# Patient Record
Sex: MALE | Race: WHITE | NOT HISPANIC OR LATINO | Employment: FULL TIME | URBAN - METROPOLITAN AREA
[De-identification: names, ages, dates, MRNs, and addresses within clinical notes are randomized per-mention and may not be internally consistent; named-entity substitution may affect disease eponyms.]

---

## 2023-07-28 LAB
HEPATITIS B VIRUS SURFACE AG PRESENCE IN SERUM: NONREACTIVE
HEPATITIS C VIRUS AB PRESENCE IN SERUM: NONREACTIVE
HIV 1/ 2 AG/AB SCREEN: NONREACTIVE
SYPHILIS TOTAL AB: NONREACTIVE

## 2023-07-29 LAB
CHLAMYDIA TRACH., AMPLIFIED: NEGATIVE
N. GONORRHEA, AMPLIFIED: NEGATIVE

## 2023-10-22 DIAGNOSIS — Z31.41 FERTILITY TESTING: ICD-10-CM

## 2023-10-23 ENCOUNTER — ANCILLARY PROCEDURE (OUTPATIENT)
Dept: ENDOCRINOLOGY | Facility: CLINIC | Age: 33
End: 2023-10-23
Payer: COMMERCIAL

## 2023-10-23 ENCOUNTER — APPOINTMENT (OUTPATIENT)
Dept: ENDOCRINOLOGY | Facility: CLINIC | Age: 33
End: 2023-10-23
Payer: COMMERCIAL

## 2023-10-23 DIAGNOSIS — Z31.41 FERTILITY TESTING: ICD-10-CM

## 2023-10-23 LAB
ABSTINENCE (DAYS): 3 DAYS (ref 2–7)
AGGLUTINATION (SEMEN): NO
ANALYZED TIME:: ABNORMAL
ANDROLOGY LAB ID#: ABNORMAL
CLUMPS (SEMEN): NO
COLLECTED COMPLETELY: YES
COLLECTION LOCATION:: ABNORMAL
COLLECTION METHOD:: ABNORMAL
CONCENTRATION CN (POST-WASH): 14.54 MILL/ML
CONCENTRATION(SEMEN): 20.73 MILL/ML (ref 15–?)
DEBRIS (SEMEN): YES
LEUKOCYTE (SEMEN): NEGATIVE
NON PROG. MOTILITY (SEMEN): 6 %
NON PROG. MOTILITY CN (POST-WASH): 9 %
PROG. MOTILITY (SEMEN): 42 % (ref 32–?)
PROG. MOTILITY CN (POST-WASH): 53 %
RECEIVED TIME:: ABNORMAL
SEMEN APPEARANCE: NORMAL
SEMEN LIQUEFACTION: NORMAL
SEMEN VISCOSITY: NORMAL
SPERM PROCESS METHOD: ABNORMAL
TOTAL MOTILITY (SEMEN): 48 % (ref 40–?)
TOTAL MOTILITY CN (POST-WASH): 62 %
TOTAL NO OF MOTILE (SEMEN): 13.57 MILL
TOTAL NO OF MOTILE CN (POST-WASH): 1.81 MILL
TOTAL NO OF RND CELLS (SEMEN): 1.1 MILL (ref ?–5)
TOTAL NO OF SPERM (SEMEN): 27.98 MILL (ref 39–?)
TOTAL NO OF SPERM CN (POST-WASH): 2.91 MILL
VOLUME (SEMEN): 1.35 ML (ref 1.5–?)
VOLUME CN (POST-WASH): 0.2 ML

## 2023-11-07 DIAGNOSIS — L02.511 ABSCESS OF FINGER OF RIGHT HAND: Primary | ICD-10-CM

## 2023-11-07 RX ORDER — AMOXICILLIN AND CLAVULANATE POTASSIUM 875; 125 MG/1; MG/1
1 TABLET, FILM COATED ORAL 2 TIMES DAILY
Qty: 20 TABLET | Refills: 0 | Status: SHIPPED | OUTPATIENT
Start: 2023-11-07 | End: 2023-11-17

## 2024-01-02 NOTE — PROGRESS NOTES
No chief complaint on file.      History Of Present Illness  Jesus Castañeda is a 33 y.o. male presenting with anal pain.     Subjective   Jesus Castañeda was referred by PCP  for evaluation of anal pain Symptoms began 5 years ago. He experiences intermittent periods of very sharp pain with bowel movements. His bowel movements are usually formed, occasionally hard    Pain: Yes - with BM  Protruding Tissue: No  Bleeding: Yes - intermittent small volume       Bowel habits:  - Moves bowels daily  - Stool is soft  - he  has to strain No and spends 5 minutes on the toilet to have a BM.   - Issues with leakage or incontinence of stool: No   - Full episodes of incontinence: No   - Leakage of liquid stool or mucous: No   - Wears a pad daily: No   - Effects QoL: Mild  - Pain with BM's: Yes - sharp, stinging  - Protruding Tissue: No.   - Bleeding with BM's: Yes . Drips into toilet bowNo, On stoolYes, In stoolYes, On Toilet Paper when you wipeYes - occasional     Dietary history:   Eats a relatively low fiber diet, limited access to drinking water during day due to work, does not take a fiber supplement    Colonoscopy: no    Past Medical History  He has a past medical history of Other specified health status.    Surgical History  He has a past surgical history that includes Tonsillectomy (09/19/2017); Foot surgery (09/19/2017); and Knee surgery (09/19/2017).     Social History  He reports that he has never smoked. He has never used smokeless tobacco. He reports current alcohol use of about 1.0 standard drink of alcohol per week. He reports that he does not use drugs.    Family History  No family history on file.     Allergies  Patient has no known allergies.    Home Medications  Prior to Admission medications    Not on File       Review of Systems   Gastrointestinal:  Positive for anal bleeding and rectal pain. Negative for abdominal distention, abdominal pain, blood in stool, constipation, diarrhea, nausea and vomiting.   All  "other systems reviewed and are negative.       Physical Exam  Vitals reviewed.   HENT:      Head: Normocephalic.      Mouth/Throat:      Mouth: Mucous membranes are moist.   Eyes:      Extraocular Movements: Extraocular movements intact.   Cardiovascular:      Rate and Rhythm: Normal rate.   Pulmonary:      Effort: Pulmonary effort is normal.   Abdominal:      General: Abdomen is flat.      Palpations: Abdomen is soft.   Genitourinary:     Rectum: Normal.      Comments: High sphincter tone, on eversion evidence of a healing fissure  Musculoskeletal:         General: Normal range of motion.      Cervical back: Normal range of motion.   Neurological:      General: No focal deficit present.      Mental Status: He is alert.   Psychiatric:         Mood and Affect: Mood normal.         Last Recorded Vitals  Blood pressure 145/88, pulse 82, height 1.956 m (6' 5\"), weight (!) 152 kg (335 lb), SpO2 98 %.    Relevant Results        Assessment/Plan     33M with anal fissure    The anatomy, pathophysiology, treatment and options, and long term outcomes of fissures were discussed in detail.The patient will start a bowel management program. The bowel management program I recommended includes a high fiber diet (30-40 gm qd) that includes both soluble and insoluble fiber, a fiber supplement such as Metamucil, Konsyl, Benefiber 1-2 qd, and at least 8-10 glasses of fluid daily. We also discussed limited toilet sitting time.   Warm tub baths 2-4 times per day and with episodes of pain are helpful to allow sphincter relaxation and to improve perianal hygiene.   A prescription was given for topical nifedipine ointment to be used 2 times per day.   RTC acutely prn or in 6-8 weeks if symptoms persist. Discussed in detail including conservative vs surgical management (Botox injection vs. lateral internal sphincterotomy - LIS) and risk of incontinence. Patient education materials were provided    Jesus Castañeda will follow up with our " office in 6 weeks by phone wo report progress or sooner if needed.     I spent a total of 45 min spent on this patient visit.      Dr. Eliane Elam  1/9/2024

## 2024-01-03 ENCOUNTER — OFFICE VISIT (OUTPATIENT)
Dept: SURGERY | Facility: CLINIC | Age: 34
End: 2024-01-03
Payer: COMMERCIAL

## 2024-01-03 VITALS
SYSTOLIC BLOOD PRESSURE: 145 MMHG | HEART RATE: 82 BPM | HEIGHT: 77 IN | OXYGEN SATURATION: 98 % | DIASTOLIC BLOOD PRESSURE: 88 MMHG | BODY MASS INDEX: 37.19 KG/M2 | WEIGHT: 315 LBS

## 2024-01-03 DIAGNOSIS — K60.2 ANAL FISSURE: Primary | ICD-10-CM

## 2024-01-03 PROCEDURE — 99204 OFFICE O/P NEW MOD 45 MIN: CPT | Performed by: SURGERY

## 2024-01-03 PROCEDURE — 1036F TOBACCO NON-USER: CPT | Performed by: SURGERY

## 2024-01-03 ASSESSMENT — PAIN SCALES - GENERAL: PAINLEVEL: 0-NO PAIN

## 2024-01-09 ASSESSMENT — ENCOUNTER SYMPTOMS
ABDOMINAL DISTENTION: 0
ABDOMINAL PAIN: 0
NAUSEA: 0
RECTAL PAIN: 1
BLOOD IN STOOL: 0
DIARRHEA: 0
ANAL BLEEDING: 1
VOMITING: 0
CONSTIPATION: 0

## 2024-03-28 ENCOUNTER — DOCUMENTATION (OUTPATIENT)
Dept: CARE COORDINATION | Facility: CLINIC | Age: 34
End: 2024-03-28
Payer: COMMERCIAL

## 2024-04-01 ENCOUNTER — PATIENT OUTREACH (OUTPATIENT)
Dept: CARE COORDINATION | Facility: CLINIC | Age: 34
End: 2024-04-01
Payer: COMMERCIAL

## 2024-04-01 NOTE — PROGRESS NOTES
Referral received for care management services.  Outreach call to introduce self, available services, and assess needs.  Voicemail message left with my contact information.   ADY Alan  O    Contact: [phone]

## 2024-04-08 DIAGNOSIS — F41.9 ANXIETY: Primary | ICD-10-CM

## 2024-04-09 RX ORDER — BUSPIRONE HYDROCHLORIDE 5 MG/1
5 TABLET ORAL 2 TIMES DAILY
Qty: 20 TABLET | Refills: 0 | Status: SHIPPED | OUTPATIENT
Start: 2024-04-09 | End: 2024-04-16 | Stop reason: SDUPTHER

## 2024-04-09 RX ORDER — ESCITALOPRAM OXALATE 10 MG/1
10 TABLET ORAL DAILY
Qty: 10 TABLET | Refills: 0 | Status: SHIPPED | OUTPATIENT
Start: 2024-04-09 | End: 2024-04-16 | Stop reason: SDUPTHER

## 2024-04-15 ENCOUNTER — PATIENT OUTREACH (OUTPATIENT)
Dept: CARE COORDINATION | Facility: CLINIC | Age: 34
End: 2024-04-15
Payer: COMMERCIAL

## 2024-04-15 NOTE — PROGRESS NOTES
Referral received for care management services.  Outreach call to introduce self, available services, and assess needs.  Final outreach call. Voicemail message left with my contact information.   ADY Alan  [facility]    Contact: [phone]

## 2024-04-16 ENCOUNTER — OFFICE VISIT (OUTPATIENT)
Dept: BEHAVIORAL HEALTH | Facility: CLINIC | Age: 34
End: 2024-04-16
Payer: COMMERCIAL

## 2024-04-16 VITALS
HEART RATE: 80 BPM | SYSTOLIC BLOOD PRESSURE: 141 MMHG | DIASTOLIC BLOOD PRESSURE: 79 MMHG | WEIGHT: 315 LBS | HEIGHT: 77 IN | BODY MASS INDEX: 37.19 KG/M2

## 2024-04-16 DIAGNOSIS — F63.2 KLEPTOMANIA IN ADULT: ICD-10-CM

## 2024-04-16 DIAGNOSIS — F31.81 BIPOLAR 2 DISORDER (MULTI): ICD-10-CM

## 2024-04-16 DIAGNOSIS — F41.9 ANXIETY: ICD-10-CM

## 2024-04-16 DIAGNOSIS — F33.9 EPISODE OF RECURRENT MAJOR DEPRESSIVE DISORDER, UNSPECIFIED DEPRESSION EPISODE SEVERITY (CMS-HCC): Primary | ICD-10-CM

## 2024-04-16 PROCEDURE — 99205 OFFICE O/P NEW HI 60 MIN: CPT

## 2024-04-16 PROCEDURE — 1036F TOBACCO NON-USER: CPT

## 2024-04-16 RX ORDER — ESCITALOPRAM OXALATE 10 MG/1
10 TABLET ORAL DAILY
Qty: 30 TABLET | Refills: 2 | Status: SHIPPED | OUTPATIENT
Start: 2024-04-16 | End: 2024-04-19

## 2024-04-16 RX ORDER — ARIPIPRAZOLE 2 MG/1
2 TABLET ORAL DAILY
Qty: 30 TABLET | Refills: 0 | Status: SHIPPED | OUTPATIENT
Start: 2024-04-16 | End: 2024-05-06 | Stop reason: SDUPTHER

## 2024-04-16 RX ORDER — BUSPIRONE HYDROCHLORIDE 5 MG/1
5 TABLET ORAL 2 TIMES DAILY
Qty: 60 TABLET | Refills: 2 | Status: SHIPPED | OUTPATIENT
Start: 2024-04-16 | End: 2024-06-15
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
## 2024-04-19 RX ORDER — ESCITALOPRAM OXALATE 10 MG/1
10 TABLET ORAL DAILY
Qty: 30 TABLET | Refills: 2 | Status: SHIPPED | OUTPATIENT
Start: 2024-04-19 | End: 2025-04-19

## 2024-04-25 ENCOUNTER — TELEMEDICINE (OUTPATIENT)
Dept: BEHAVIORAL HEALTH | Facility: CLINIC | Age: 34
End: 2024-04-25
Payer: COMMERCIAL

## 2024-04-25 DIAGNOSIS — F33.1 MODERATE EPISODE OF RECURRENT MAJOR DEPRESSIVE DISORDER (MULTI): ICD-10-CM

## 2024-04-25 DIAGNOSIS — F41.9 ANXIETY: ICD-10-CM

## 2024-04-25 PROCEDURE — 90791 PSYCH DIAGNOSTIC EVALUATION: CPT | Mod: U2,95
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
## 2024-04-30 ENCOUNTER — TELEMEDICINE (OUTPATIENT)
Dept: BEHAVIORAL HEALTH | Facility: CLINIC | Age: 34
End: 2024-04-30
Payer: COMMERCIAL

## 2024-04-30 DIAGNOSIS — F31.81 BIPOLAR 2 DISORDER (MULTI): ICD-10-CM

## 2024-04-30 DIAGNOSIS — F33.9 EPISODE OF RECURRENT MAJOR DEPRESSIVE DISORDER, UNSPECIFIED DEPRESSION EPISODE SEVERITY (CMS-HCC): ICD-10-CM

## 2024-04-30 DIAGNOSIS — F63.2 KLEPTOMANIA IN ADULT: ICD-10-CM

## 2024-04-30 PROCEDURE — 99215 OFFICE O/P EST HI 40 MIN: CPT | Performed by: CLINICAL NURSE SPECIALIST

## 2024-04-30 NOTE — PROGRESS NOTES
Outpatient Psychiatry      Subjective   Jesus Castañeda, is a 34 y.o. male,  seen as a new patient to me today for IOP intake assessment.    I have reviewed the initial assessment completed by SRI Germain on 4/25/2024, and certify its validity.    Assessment/Plan   Diagnoses:   Encounter Diagnoses   Name Primary?    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      Treatment Plan/Recommendations:   Admit to IOP for up to 4 days/week or 12 hours/week.  Estimated start date 5/6/2024.  Continue current medications for now.   Follow-up for medication management weekly.        Reason for Visit:  IOP intake.    HPI:  Jesus Castañeda reports     Very difficult time communicating about issues and problems he's going through, and just letting things build up.   He and wife are going through IVF- didn't want to burden her with his problems.   Financial stressors.  He and his wife not seeing eye to eye on things. Seems like since he got out of the hospital they've been having a hard time.   Wife's therapist expressed concern for codependency, and they have started to work on clarifying that  Felt like things were building up for about 2 years     Started Lexapro, Buspar  Abilify ~ 1.5 weeks ago    Having some difficulty getting to sleep (~past week). Once he gets to sleep  Goes to bed around 9:30-10, falls asleep around midnight and gets up around 5 AM.  Appetite has been a little bit lower the past month. Has lost 15 lbs in ~1 month.     Remote history of self-harm by cutting- most recent was ~1 month ago, prior to hospitalization.  Has had suicidal thoughts in the past, but never with specific plan, and never made attempt until prior to hospitalization last month.      (+) impulsivity, lack of  "self-control  \"I know I shouldn't be doing something, but it doesn't cross my mind at that time.\"   Believes he's had an addictive and impulsive personality throughout life, but has gotten worse with stressors building up in life.     Has functioned with normal energy on minimal sleep in the past (2 hrs of sleep over a 48 hr period). Never excessive energy or more extended periods of time (never a week or more).  No history of psychosis.       Previous psychiatric medication trials include:  Zoloft- sexual side effects  Lexapro- beneficial  Alprazolam PRN- beneficial    Past Psychiatric History  Reviewed per medical record.       Pertinent medical diagnoses:  No hx of head injury, concussion, seizure.     Current Medications:  Lexapro 10 mg PO daily   Buspirone 10 mg PO BID  Abilify 2 mg PO daily- started ~1.5 weeks ago  Trazodone 50 mg PO at bedtime PRN for sleep    Medical History:  Past Medical History:   Diagnosis Date    Other specified health status     No known health problems       Surgical History:  Past Surgical History:   Procedure Laterality Date    FOOT SURGERY  09/19/2017    Foot Repair    KNEE SURGERY  09/19/2017    Knee Surgery    TONSILLECTOMY  09/19/2017    Tonsillectomy       Family History:  Reviewed per medical record.    Social History:  Reviewed per medical record.     Substance Use:  Nicotine: vapes nicotine daily x past few months, former smoker (~5-10 cigarettes/day x 10 years). Has attempted to quit   Alcohol: \"socially,\" 2-3 drinks/week. No hx of alcohol abuse.   Cocaine: used around 19-18 years old on weekends.   Caffeine: 1 energy drink daily (~200 mg caffeine)      Record Review: moderate     Medical Review Of Systems:  Pertinent items are noted in HPI.    Psychiatric Review Of Systems:  As noted in HPI.        Objective   Mental Status Exam  General: well groomed, appropriate eye contact  Psychomotor: no psychomotor agitation or retardation, no tremor or other abnormal " "movements  Speech: Clear and Non-pressured  Mood: \" \"  Affect:dysphoric, constricted, anxious, and congruent with mood and topic of conversation  Thought Process: linear, goal directed  Perception: Denies hallucinations. Does not appear internally stimulated.   Thought Content: No suicidal ideation. No violent thinking. No delusional content.   Cognition: cognitively intact to conversational testing with respect to attention, orientation, fund of knowledge, recent and remote memory, and language  Insight: intact  Judgement: intact     Other Objective Information:    Risk Assessment  Imminent Risk of Suicide or Serious Self-Injury: Medium Risk - Risk factors include: {Depression, Gender, History of self harm , History of suicide attempt , and Sense of isolation , Protective Factors include: Denies current suicidal ideation, Future-oriented talk , Willingness to seek help and support , Age, Access to a variety of clinical interventions , and Restricted access to firearms or other lethal means of suicide   Imminent Risk of Violence or Homicide:  No significant risk factors identified on screening      Malissa Loyola, APRN-CNP, APRN-CNS    "

## 2024-05-06 ENCOUNTER — CLINICAL SUPPORT (OUTPATIENT)
Dept: BEHAVIORAL HEALTH | Facility: CLINIC | Age: 34
End: 2024-05-06
Payer: COMMERCIAL

## 2024-05-06 ENCOUNTER — TELEMEDICINE (OUTPATIENT)
Dept: BEHAVIORAL HEALTH | Facility: CLINIC | Age: 34
End: 2024-05-06
Payer: COMMERCIAL

## 2024-05-06 DIAGNOSIS — F63.2 KLEPTOMANIA IN ADULT: ICD-10-CM

## 2024-05-06 DIAGNOSIS — F31.81 BIPOLAR 2 DISORDER (MULTI): ICD-10-CM

## 2024-05-06 DIAGNOSIS — F41.9 ANXIETY: ICD-10-CM

## 2024-05-06 DIAGNOSIS — F33.1 MODERATE EPISODE OF RECURRENT MAJOR DEPRESSIVE DISORDER (MULTI): ICD-10-CM

## 2024-05-06 DIAGNOSIS — F33.9 EPISODE OF RECURRENT MAJOR DEPRESSIVE DISORDER, UNSPECIFIED DEPRESSION EPISODE SEVERITY (CMS-HCC): ICD-10-CM

## 2024-05-06 PROCEDURE — 1036F TOBACCO NON-USER: CPT | Performed by: CLINICAL NURSE SPECIALIST

## 2024-05-06 PROCEDURE — 99203 OFFICE O/P NEW LOW 30 MIN: CPT | Performed by: CLINICAL NURSE SPECIALIST

## 2024-05-06 PROCEDURE — 90853 GROUP PSYCHOTHERAPY: CPT | Mod: U2,95

## 2024-05-06 RX ORDER — TRAZODONE HYDROCHLORIDE 50 MG/1
50 TABLET ORAL NIGHTLY PRN
COMMUNITY
Start: 2024-03-25 | End: 2024-05-06 | Stop reason: SDUPTHER

## 2024-05-06 RX ORDER — TRAZODONE HYDROCHLORIDE 50 MG/1
50 TABLET ORAL NIGHTLY PRN
Qty: 30 TABLET | Refills: 0 | Status: SHIPPED | OUTPATIENT
Start: 2024-05-06 | End: 2024-06-10 | Stop reason: SDUPTHER

## 2024-05-06 RX ORDER — ARIPIPRAZOLE 2 MG/1
2 TABLET ORAL DAILY
Qty: 30 TABLET | Refills: 1 | Status: SHIPPED | OUTPATIENT
Start: 2024-05-06 | End: 2024-06-10 | Stop reason: DRUGHIGH

## 2024-05-06 NOTE — GROUP NOTE
Group Topic: Goals   Group Date: 5/6/2024  Start Time:  9:00 AM  End Time: 10:00 AM  Facilitators: Selina Gusman [facility]   Department: [facility]    Number of Participants: 7   Group Focus: check in and goals  Treatment Modality: Cognitive Behavioral Therapy  Interventions utilized were clarification and support  Purpose: coping skills, feelings, and insight or knowledge    This was a video IOP group on a HIPAA compliant platform. All patients were provided with informed consent.     Date: 05/06/24, Time: 9:00-10:00 am group, Number of Patients Present: 7, User Name: [email or web address], CT, Number of Staff: 2     Group topic(s): Weekend check-ins     On Thursday, pts. were presented with and completed the SMART goals worksheet, which included goals in the following categories: achievement, closeness to others, and enjoyment, along with anticipated obstacles and coping strategies they can utilize. Today, pts. checked in, shared about their weekends, and set intentions for the week.    Facilitator: HERI Claros  Supervised by Marleny Freedman [facility]-S, ATR  Secondary Facilitator: Selina Gusman [facility]    Name: Jesus Castañeda YOB: 1990   MR: [id number]    Pt. feels optimistic about starting group and shared “I want to find ways to cope and learn skills to help better myself and my marriage.” Pt. shared the highlight of his weekend: attending his friend's wife's birthday party, despite not wanting to go initially - “I eventually did go, and it was very nice.” Pt. shared that the communication between himself and his wife has been poor, which has been an ongoing concern over the past three years - “It's been three years of me not accepting or getting help. Now that I am finally doing it, it's a fresh start for me.” Pt. shared that he would like to learn how to be vulnerable and ask for help. Pt. set intentions to listen more throughout the week - “I will listen to my wife's feelings, to family's advice and  group's suggestions.”  Pt. was present, attentive, and fully engaged during group discussion.

## 2024-05-06 NOTE — PROGRESS NOTES
"Outpatient Psychiatry      Subjective   Jesus Castañeda, is a 34 y.o. male seen in follow up today.       Assessment/Plan   Diagnoses:   Encounter Diagnoses   Name Primary?    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 No indication for medication changes today.      Making progress toward IOP treatment goals. Treatment plan reviewed with IOP team weekly.     Treatment Plan/Recommendations:   Continue aripiprazole 2 mg PO daily.   Continue escitalopram 10 mg PO daily.   Continue buspirone 5 mg PO BID.   Continue trazodone 50 mg PO at bedtime PRN for sleep.   Continue engagement in IOP for up to 4 days/week or 12 hours/week.   Follow-up in 1 week for medication management.       Reason for Visit:  IOP follow-up for medication management.     HPI:  Since his last visit,    \"Ups and downs,\" but doing ok.   First week of groups went well.     Mood has been about the same- not experiencing big swings.   Noted some increased irritability yesterday during conversation with wife that was challenging, but overall mood has felt more stable, level.   Sleep has been better- taking trazodone PRN with good benefit.   No change in energy level.   Appetite has still been lower than average.   Some impulsivity this week- bought snacks one day instead of eating what they had at home. He and wife have talked about not spending money they don't need to d/t financial strain right now.   Working on strategies to reduce impulsivity in groups, and he and wife have also come up with some.     Since starting Abilify feels like it's helped with irritability and reactivity.   Feels more level-headed.   Tolerating without significant side effects.   Otherwise no new concerns with medications.     Denies suicidal ideation or a passive death wish.     No new medications " "or health problems.       Current Psychiatric Medications:  Lexapro 10 mg PO daily   Buspirone 10 mg PO BID  Abilify 2 mg PO daily  Trazodone 50 mg PO at bedtime PRN for sleep      Record Review: brief     Medical Review Of Systems:  Pertinent items are noted in HPI.    Psychiatric Review Of Systems:  As noted in HPI.        Objective   Mental Status Exam  General Appearance: Well groomed, appropriate eye contact  Attitude/Behavior: Cooperative  Motor: No psychomotor agitation or retardation, no tremor or other abnormal movements  Speech: Normal rate, volume, prosody  Gait/Station: Other:(comment) (not observed- virtual)  Mood: \"more level.\"  Affect: Constricted, Congruent with mood and topic of conversation  Thought Process: Linear, goal directed  Thought Associations: No loosening of associations  Thought Content: Other: (comment) (some ongoing anxious themes.)  Perception: No perceptual abnormalities noted  Sensorium: Alert and oriented to person, place, time and situation  Insight: Intact  Judgement: Intact  Cognition: Cognitively intact to conversational testing with respect to attention, orientation, fund of knowledge, recent and remote memory, and language      Vitals:  There were no vitals filed for this visit.        Malissa Loyola, APRN-CNP, APRN-CNS   "

## 2024-05-07 ENCOUNTER — CLINICAL SUPPORT (OUTPATIENT)
Dept: BEHAVIORAL HEALTH | Facility: CLINIC | Age: 34
End: 2024-05-07
Payer: COMMERCIAL

## 2024-05-07 DIAGNOSIS — F33.1 MAJOR DEPRESSIVE DISORDER, RECURRENT EPISODE, MODERATE (MULTI): ICD-10-CM

## 2024-05-07 DIAGNOSIS — F41.1 GENERALIZED ANXIETY DISORDER: ICD-10-CM

## 2024-05-07 PROCEDURE — 90853 GROUP PSYCHOTHERAPY: CPT | Mod: 95 | Performed by: COUNSELOR

## 2024-05-07 NOTE — GROUP NOTE
Group Topic: Feeling Awareness/Expression   Group Date: 5/6/2024  Start Time: 10:00 AM  End Time: 11:00 AM  Facilitators: SRI Dudley   Department: [facility]    This was a video IOP group on a HIPAA compliant platform. All patients were provided with informed consent.     Date: 5/6/24  Time: 10:00-11:00 am group  Number of Patients Present: 7  User Name: SRI Chavez, ATR  Number of Staff: 2    Group Topic(s): Mindfulness meditation/intention setting/Psychoeducation on boundaries      Pt. first introduced themselves to the group and engaged in a 10-minute mindfulness medication. Pt. then received psychoeducation on boundaries including: what they are, types, and tips for setting them. Pt. was present, attentive, and fully engaged in group discussion.     Primary Facilitator: SRI Avendano, ATR  Secondary Facilitator: Luke Sánchez CT  Supervised by SRI Larry-S, ATR    Name: Jesus Castañeda YOB: 1990   MR: [id number]

## 2024-05-07 NOTE — GROUP NOTE
Group Topic: Coping Skills   Group Date: 5/7/2024  Start Time:  9:00 AM  End Time: 10:00 AM  Facilitators: SRI Dudley   Department: [facility]      This was a video IOP group on a HIPAA compliant platform. All patients were provided with informed consent.     Date: 5/7/24  Time: 9:00 - 10:00 am group  Number of Patients Present: 8  Number of Staff: 2  User Name: CAMILO Chavez, ATR    Group Topic(s):  Check-ins Coping statements and Coping cards     First, pt. introduced themselves to the group and engaged in a feelings check-in. Next, pt. received psychoeducation on coping statements and coping cards; including what they are, tips for writing and utilizing them, and examples of coping statements for the following: anxiety, fear, feeling stressed/overwhelmed, phobias, panic, pain management, disordered eating, and pain management. Pt. spent 7 minutes independently developing their own coping cards. Lastly, pt. shared their coping cards/statements and spoke about how they plan to utilize them. Pt. was present, attentive, and fully engaged throughout session.     Facilitator: SRI Avendano, ATR  Secondary Facilitator: HERI Claros  Supervised by SRI Larry-S, ATR    Name: Jesus Castañeda YOB: 1990   MR: [id number]      Edgar shared feeling calm and content. Edgar shared coping statements for anxiety, depression, and impulsivity.

## 2024-05-07 NOTE — GROUP NOTE
"Group Topic: Feeling Awareness/Expression   Group Date: 5/6/2024  Start Time: 11:00 AM  End Time: 12:00 PM  Facilitators: SRI Dudley   Department: [facility]    This was a video IOP group on a HIPAA compliant platform. All patients were provided with informed consent.   Date: 5/6/24  Time: 11:00 am-12:00 pm group  Number of Patients Present: 7  User Name: SRI Chavez, ATR  Number of Staff: 2    Group Topic(s): Boundaries through the use of art-based media     Pt. was prompted to draw a figure that represented themselves in some way and created a boundary around it. Next, pt. independently answered the following questions:  1. Describe the type of boundary you che  2. Is the boundary large or small, thick or thin?  3. Has the boundary been up for a short time or a long time?  4. Is it easy or difficult to penetrate?  5. Has it been a help or a hindrance?  6. Would you like to keep it up or begin to tear it down?    Lastly, pts shared and processed with the group. Pt. was present, attentive, and fully engaged in group discussion.     Primary Facilitator: SRI Avednano, ATR  Secondary Facilitator: HERI Claros  Supervised by SRI Larry-S, ATR    Name: Jesus Castañeda YOB: 1990   MR: [id number]      Edgar che a brick wall and discussed rigid boundaries he has with his wife and family.   Edgar shared that he tends to have more porous boundaries with his co-workers   Edgar shared that he asks a lot from other people   \"I struggle to open up with my family and my wife\"  \"I want to work on boundaries and communication while in IOP\"      "

## 2024-05-08 ENCOUNTER — CLINICAL SUPPORT (OUTPATIENT)
Dept: BEHAVIORAL HEALTH | Facility: CLINIC | Age: 34
End: 2024-05-08
Payer: COMMERCIAL

## 2024-05-08 DIAGNOSIS — F33.1 MODERATE EPISODE OF RECURRENT MAJOR DEPRESSIVE DISORDER (MULTI): ICD-10-CM

## 2024-05-08 DIAGNOSIS — F41.9 ANXIETY: ICD-10-CM

## 2024-05-08 PROCEDURE — 90853 GROUP PSYCHOTHERAPY: CPT | Mod: U2,95

## 2024-05-08 NOTE — GROUP NOTE
"Group Topic: Feeling Awareness/Expression   Group Date: 5/7/2024  Start Time: 10:00 AM  End Time: 11:00 AM  Facilitators: Selina Gusman [facility]   Department: [facility]    Number of Participants: 8   Group Focus: diagnosis education  Treatment Modality: Psychoeducation  Interventions utilized were assignment, group exercise, and patient education  Purpose: insight or knowledge    Topic: Symptom Identification  Group focused on identification of symptoms. They listed their symptoms of their diagnosis. Then they categorized their symptoms into behavior, emotion/sensation, thoughts.  Group was split into smaller groups to share their findings.     Selina Gusman [facility] facilitator   Radha Wynn, CT secondary facilitator   Marleny Freedman [facility]-S, ATR - supervisor     Name: Jessu Castañeda YOB: 1990   MR: [id number]    His most prominent symptoms come from his bipolar 2 diagnosis.   When he is in hypomania he gets impulsive: whether it is with tasks, purchases - eg. \"I started painting my entire house and an hour later I don't want to do it anymore.\"  Focused on bipolar sx for exercise - \"I found a lot of them to be behavioral or sensational.\" When he's in an \"excited\" state he has sudden impulses, increased mood, increased energy, and takes on more tasks. Shared that he has stolen before, and had thoughts of theft. When it comes crashing down he gets irritable and isolates.     "

## 2024-05-08 NOTE — GROUP NOTE
"Group Topic: Personal Responsibility   Group Date: 5/8/2024  Start Time:  9:00 AM  End Time: 10:00 AM  Facilitators: Selina Gusman [facility]   Department: [facility]    Number of Participants: 8   Group Focus: affirmation, check in, clarity of thought, other progress and what it means, and personal responsibility  Treatment Modality: Patient-Centered Therapy and Psychoeducation  Interventions utilized were clarification, exploration, patient education, and support  Purpose: feelings and irrational fears    Topic: Process/patient led discussion of progress and fear of regression, safety visualization     Group explored topics based on groups interest. Time spent discussing progress, fears of regression, and exploration of themes of cognitive distortion and self-doubt. Time spent providing some psychoeducation. Group was guided through a safe place visualization to finish this session.     Facilitated by Selina Gusman [facility]   HERI Dominguez   Supervised by Marleny Freedman [facility]-S, ATR    Name: Jesus Castañeda YOB: 1990   MR: [id number]    Patient S=shared that he knows he needs to learn to be vulnerable and open with people closest to him.   He stated he self-sabotages.   A question he shared, \"is this happiness because my life is getting better? or it's just a hypomania phase?\"  He was off camera for much of group as he was not feeling well.           "

## 2024-05-08 NOTE — GROUP NOTE
"Group Topic: Coping Skills   Group Date: 5/7/2024  Start Time: 11:00 AM  End Time: 12:00 PM  [facility]   Department: [facility]    Number of Participants: 8   Group Focus: coping skills, diagnosis education, feeling awareness/expression, and other differentiation of symptom severity   Treatment Modality: Psychoeducation  Interventions utilized were assignment, exploration, and group exercise  Purpose: coping skills and insight or knowledge    Topic: Symptom Identification   This hour group took the previous hour's findings and built on it- recognizing when a symptom is mild, moderate, severe. Then group was broken into smaller groups to discuss, ask questions, and report findings. With the time remaining, members were invited to match their coping cards/skills to symptoms. Takeaways were shared voluntarily.   Selina Gusman [facility] - facilitator  Radha Wynn, CT- secondary facilitator   Marleny Freedman- [facility]-S, ATR supervisor      Name: Jesus Castañeda YOB: 1990   MR: [id number]    Impulsive sx: thinking about it (buying things), making a plan, following through, and overexerting self on that. Then goes into other sx: taking on other tasks. He then tells himself that it will be okay, then does the task and is overspent after doing it.     Shared that it has been helpful to hear that they're not the only ones struggling.   His takeaway today: \"It doesn't have to be a drastic change immediately. I sometimes think if it's not a drastic move then it doesn't matter. I'm learning now that small steps lead to big steps.\"     "

## 2024-05-09 ENCOUNTER — CLINICAL SUPPORT (OUTPATIENT)
Dept: BEHAVIORAL HEALTH | Facility: CLINIC | Age: 34
End: 2024-05-09
Payer: COMMERCIAL

## 2024-05-09 DIAGNOSIS — F41.9 ANXIETY: ICD-10-CM

## 2024-05-09 DIAGNOSIS — F33.1 MODERATE EPISODE OF RECURRENT MAJOR DEPRESSIVE DISORDER (MULTI): ICD-10-CM

## 2024-05-09 PROCEDURE — 90853 GROUP PSYCHOTHERAPY: CPT | Mod: U2,95

## 2024-05-09 NOTE — GROUP NOTE
Group Topic: Goals   Group Date: 5/9/2024  Start Time:  9:00 AM  End Time: 10:00 AM  Facilitators: Selina Gusman LPC   Department: [facility]    Number of Participants: 7   Group Focus: check in and other review of topics, SMART goal setting  Treatment Modality: Cognitive Behavioral Therapy and Patient-Centered Therapy  Interventions utilized were assignment, group exercise, and support  Purpose: insight or knowledge and other: goal setting/accountability     Topic: mindfulness exercise, review of topics, SMART goals     This group began with a 3 minute square breathing exercise. Then group discussed the week's topics and spent time in discussion of the topic of regression; feedback and dialogue generated. Finally, the group set SMART goals and shared them with the group for accountability.     Faciltated by Selina Gusman [facility]   Secondary Facilitation HERI Claros   Supervised by SRI Larry-S, ATR     Name: Jesus Castañeda YOB: 1990   MR: [id number]      Patient shared boundaries group was hard for him - has rigid borders with family and loved ones. It is harder for him to be vulnerable with his loved ones than with friends and strangers.   Recognizes that everyone backslides and its important to stop the backslide before it gets really bad.     Goals - go see his mom on mother's day, spend time with himself once a day, get outside/play golf.

## 2024-05-10 ENCOUNTER — DOCUMENTATION (OUTPATIENT)
Dept: BEHAVIORAL HEALTH | Facility: CLINIC | Age: 34
End: 2024-05-10
Payer: COMMERCIAL

## 2024-05-10 NOTE — GROUP NOTE
Group Topic: Feeling Awareness/Expression   Group Date: 5/8/2024  Start Time: 11:00 AM  End Time: 12:00 PM  Facilitators: Maria C Moreno PsyD   Department: [facility]    Number of Participants: 8   Group Focus: other Values and Aspirations  Treatment Modality: Cognitive Behavioral Therapy  Interventions utilized were group exercise  Purpose: other: Values and Aspirations.    Date: 5/8/2023, Time: 11a-12p Number of Patients: 8, Username: [email or web address], Number of Staff: 2    Group focused on aspirations. Provided psychoeducation on the differences between aspirations and goals. Discussed intrinsic and extrinsic aspirations. Completed aspiration exploration questions. Group members shared their answers with the smaller group. Discussed barriers to determining aspirations.     Primary Facilitator: Maria C Moreno PsyD  Secondary Facilitator - Radha LIRIANO  Supervisor Marleny Freedman, [facility]-S, ATR    Name: Jesus Castañeda YOB: 1990   MR: [id number]      Edgar appeared attentive but did not actively participate.

## 2024-05-10 NOTE — GROUP NOTE
Group Topic: Feeling Awareness/Expression   Group Date: 5/8/2024  Start Time: 10:00 AM  End Time: 11:00 AM  Facilitators: Maria C Moreno PsyD   Department: [facility]    Number of Participants: 8   Group Focus: other Values and Aspirations  Treatment Modality: Cognitive Behavioral Therapy  Interventions utilized were patient education  Purpose: other: Values and Aspirations    Date: 5/8/2024, Time: 10a-11a Number of Patients: 8, Username: [email or web address], Number of Staff: 2    This group focused on values and aspirations. Group spent time discussing categories of values including (physical wellbeing, spirituality, friendships, intimate relationships, hobbies, education, employment, etc). Then group reflected on what values are important to them and shared how their values have changed. Additionally, group discussed aspirations and how they may be different from goals. Members connected values and aspirations to the cognitive model.    Primary Facilitator: Maria C Moreno PsyD  Secondary Facilitator - Radha LIRIANO  Supervisor Marleny Freedman [facility]-S, ATR    Name: Jesus Castañeda YOB: 1990   MR: [id number]      Edgar shared he is not feeling well. His values are personal relationships.

## 2024-05-10 NOTE — GROUP NOTE
Group Topic: Feeling Awareness/Expression   Group Date: 5/9/2024  Start Time: 11:00 AM  End Time: 12:00 PM  Facilitators: SRI Dudley   Department: [facility]        Name: Jesus Castañeda YOB: 1990   MR: [id number]    This was a video IOP group on a HIPAA compliant platform. All patients were provided with informed consent.     Date: 05/09/24, Time: 11:00 - 12:00 pm group, Number of Patients Present: 8, User Name: [email or web address], CT, Number of Staff: 2     Group topic(s): Self Esteem & Self Compassion     Pts. were provided with psychoeducation on self-esteem and self-compassion. Pts. then participated in a creative exercise to explore the ways in which they are currently practicing self-compassion and “filling their cups.” Pts. also identified additional ways they can practice self-compassion moving forward.     For the exercise, pt. shared that he practices self-compassion/fills his cup by engaging in activities with his wife and family, rekindling friendships/starting to communicate more after pushing close friends away and playing video games/golf. Pt. shared that he would like to start going on adventures with his wife/travel, be more vulnerable/open, and start a family. “I deserve a full cup because I am a good and caring person.”  Pt. was present, attentive, and fully engaged during group discussion.     Facilitator: HERI Claros  Supervised by SRI Larry-S, ATR  Secondary Facilitator: SRI Avendano, ATR

## 2024-05-10 NOTE — GROUP NOTE
Group Topic: Feeling Awareness/Expression   Group Date: 5/9/2024  Start Time: 10:00 AM  End Time: 11:00 AM  Facilitators: SRI Dudley   Department: [facility]        Name: Jesus Castañeda YOB: 1990   MR: [id number]        This was a video IOP group on a HIPAA compliant platform. All patients were provided with informed consent.     Date: 05/09/24, Time: 10:00 - 11:00 am group, Number of Patients Present: 8, User Name: [email or web address], CT, Number of Staff: 2     Group topic(s): Self Esteem & Self Compassion     Pts. participated in an exercise to identify patterns related to the way they treat others versus themselves. Pts. then shared their thoughts about the exercise and discussed their relationship with self-compassion.     Pt. shared that he is able to provide positive reinforcement, support and solutions to others when they are struggling but expressed that he finds it difficult to navigate physical boundaries. Pt. recognizes he can be “an amazing person” to others when they are upset, but still believes that he is undeserving of being happy. “I think it's important to understand that everybody deserves to be happy.” Pt. believes his inability to be self-compassionate comes from never learning how to accept failure or learning how to lose. “I've always been generally good at things. When you start to lose at life, you don't know how to respond to that in a positive way.”   Pt. was present, attentive, and fully engaged during group discussion.     Facilitator: HERI Claros  Supervised by Marleny Freedman, SRI-S, ATR  Secondary Facilitator: Georgette Germain, SRI, ATR

## 2024-05-13 ENCOUNTER — CLINICAL SUPPORT (OUTPATIENT)
Dept: BEHAVIORAL HEALTH | Facility: CLINIC | Age: 34
End: 2024-05-13
Payer: COMMERCIAL

## 2024-05-13 DIAGNOSIS — F41.9 ANXIETY: ICD-10-CM

## 2024-05-13 DIAGNOSIS — F33.1 MODERATE EPISODE OF RECURRENT MAJOR DEPRESSIVE DISORDER (MULTI): ICD-10-CM

## 2024-05-13 PROCEDURE — 90853 GROUP PSYCHOTHERAPY: CPT | Mod: U2,95
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
## 2024-05-14 ENCOUNTER — CLINICAL SUPPORT (OUTPATIENT)
Dept: BEHAVIORAL HEALTH | Facility: CLINIC | Age: 34
End: 2024-05-14
Payer: COMMERCIAL

## 2024-05-14 ENCOUNTER — APPOINTMENT (OUTPATIENT)
Dept: BEHAVIORAL HEALTH | Facility: CLINIC | Age: 34
End: 2024-05-14
Payer: COMMERCIAL

## 2024-05-14 DIAGNOSIS — F33.1 MODERATE EPISODE OF RECURRENT MAJOR DEPRESSIVE DISORDER (MULTI): ICD-10-CM

## 2024-05-14 DIAGNOSIS — F41.9 ANXIETY: ICD-10-CM

## 2024-05-14 PROCEDURE — 90853 GROUP PSYCHOTHERAPY: CPT | Mod: U2,95

## 2024-05-14 NOTE — GROUP NOTE
Group Topic: Coping Skills   Group Date: 5/14/2024  Start Time: 10:00 AM  End Time: 11:00 AM  Facilitators: SRI Rosado; SRI Dudley   Department: [facility]    This was a video IOP group on a HIPAA compliant platform. All patients were provided with informed consent.     Date: 5/14/2024, Time: 10-11a, Number of Patients: 12, User Name: [email or web address],  Number of Staff: 1  Group topic(s): CBT psychoeducation. Patient engaged in exploring the understanding of cognitive behavioral therapy including defining the three elements of the cognitive triangle; thoughts, emotions and behaviors. As a whole, patients discussed their understanding of the connection and explored specific skills that can be utilized for each component of the cognitive model.      Name: Jesus Castañeda YOB: 1990   MR: [id number]      Edgar was mostly on topic and present. Patient was on and off camera throughout group exercise.   SRI Thompson

## 2024-05-14 NOTE — GROUP NOTE
Group Topic: Coping Skills   Group Date: 5/14/2024  Start Time: 11:00 AM  End Time: 12:00 PM  Facilitators: SRI Rosado; SIR Dudley   Department: [facility]    This was a video IOP group on a HIPAA compliant platform. All patients were provided with informed consent.     Date: 5/14/2024, Time: 11a-12p, Number of Patients: 12, User Name: [email or web address],  Number of Staff: 1  Group topic(s): CBT psychoeducation & cognitive model practice. The group continued exploring the concepts attached with the cognitive model and began practicing identifying a situation, identifying the automatic thoughts, the emotions and the behaviors attached. Patient then explored and worked to create an alternate thought and subsequently identify an alternate emotion and behaviors. Group discussion followed.      Name: Jesus Castañeda YOB: 1990   MR: [id number]      Edgar was on topic and present. Patient followed along appropriately and shared an example of often worrying about how others will think of things or perceptions others have. Patient engaged appropriately in group discussion.   SRI Thompson

## 2024-05-15 ENCOUNTER — CLINICAL SUPPORT (OUTPATIENT)
Dept: BEHAVIORAL HEALTH | Facility: CLINIC | Age: 34
End: 2024-05-15
Payer: COMMERCIAL

## 2024-05-15 DIAGNOSIS — F41.9 ANXIETY: ICD-10-CM

## 2024-05-15 DIAGNOSIS — F33.1 MODERATE EPISODE OF RECURRENT MAJOR DEPRESSIVE DISORDER (MULTI): ICD-10-CM

## 2024-05-15 PROCEDURE — 90853 GROUP PSYCHOTHERAPY: CPT | Mod: U2,95

## 2024-05-15 NOTE — GROUP NOTE
Group Topic: Feeling Awareness/Expression   Group Date: 5/14/2024  Start Time:  9:00 AM  End Time: 10:00 AM  Facilitators: SRI Dudley   Department: [facility]    This was a video IOP group on a HIPAA compliant platform. All patients were provided with informed consent.    Date: 5/14/24   Time: 9:00-10:00 am group  Number of Patients Present: 12  Number of Staff: 1  User Name: SRI Chavez, ATR    Group Topic(s): Open group discussion/IOP check-ins + Meditation     Pt. first engaged in a check-in and shared current thoughts, feelings, and emotions.  Lastly, pt. engaged in a 6-minute, leaves on a stream cognitive diffusion exercise. Pt. was present, attentive, and fully engaged.     SRI Avendano, ATR     Name: Jesus Castañeda YOB: 1990   MR: [id number]      Edgar shared feeling sad and upset and spoke about his mental health has impacted his marriage. Edgar set an intention to be more productive after IOP groups this week.

## 2024-05-15 NOTE — GROUP NOTE
Group Topic: Feeling Awareness/Expression   Group Date: 5/15/2024  Start Time:  9:00 AM  End Time: 10:00 AM  Facilitators: SRI Carolina   Department: [facility]    Number of Participants: 12   Group Focus: communication, coping skills, and other letter writing   Treatment Modality: Skills Training and Other: writing  Interventions utilized were assignment, exploration, and support  Purpose: coping skills and feelings    Topic: Letter writing; a creative expression of emotion    Group discussed the benefits of writing as a practice. Group members were invited to share how they utilize writing. Then group wrote for 10 minutes; the format was letter writing to their dx/symptoms. Group was broken into smaller groups and shared parts of their writing or their experience of the writing. Group returned to large session; last 10 minutes were spent writing a letter to themselves despite their diagnosis. Group time spent sharing/processing.     Facilitated by Sleina Gusman [facility]   Secondary Facilitation: Janessa Pedro, [place]  Supervised by Marleny Freedman, SRI-S, ATR    Name: Jesus Castañeda YOB: 1990   MR: [id number]    Edgar read his letter to himself aloud in large group; he shared it made him emotional to reveal this. He was actively engaged in group.

## 2024-05-16 ENCOUNTER — CLINICAL SUPPORT (OUTPATIENT)
Dept: BEHAVIORAL HEALTH | Facility: CLINIC | Age: 34
End: 2024-05-16
Payer: COMMERCIAL

## 2024-05-16 DIAGNOSIS — F41.9 ANXIETY: ICD-10-CM

## 2024-05-16 DIAGNOSIS — F33.1 MODERATE EPISODE OF RECURRENT MAJOR DEPRESSIVE DISORDER (MULTI): ICD-10-CM

## 2024-05-16 PROCEDURE — 90853 GROUP PSYCHOTHERAPY: CPT | Mod: 95 | Performed by: COUNSELOR

## 2024-05-16 NOTE — GROUP NOTE
Group Topic: Feeling Awareness/Expression   Group Date: 5/13/2024  Start Time: 11:00 AM  End Time: 12:00 PM  Facilitators: Maria C Moreno PsyD   Department: [facility]    Number of Participants: 12   Group Focus: feeling awareness/expression  Treatment Modality: Cognitive Behavioral Therapy  Interventions utilized were patient education  Purpose: other: self awareness and personal logo    Date: 5/15/2024, Time: 11a-12p Number of Patients: 12, Username: [email or web address], Number of Staff: 2    This session was conducted via HIPAA compliant video Zoom telehealth platform. Patient was provided with informed consent. Group focused personal logo and self-awareness. Group members created a personal logo. They also answered exploration questions about themselves to develop group cohesion and increase self-awareness.    Primary Facilitator: Maria C Moreno PsyD  Secondary Facilitator - Janessa LIRIANO  Supervisor Marleny Freedman, [facility]-S, ATR    Name: Jesus Castañeda YOB: 1990   MR: [id number]      Edgar shared his personal logo. Kai an apple, representing his versatility. Apples can be sweet, sour, tart, and there's usually a flavor for someone. Openness is another self-identified strength. Strengths help him show up authentically. Wishes to be liked by others, likes to help and build others up when they're down.

## 2024-05-16 NOTE — GROUP NOTE
Group Topic: Coping Skills   Group Date: 5/16/2024  Start Time: 11:00 AM  End Time: 12:00 PM  Facilitators: Selina Gusman [facility]   Department: [facility]    Number of Participants: 12   Group Focus: affirmation, clarity of thought, and coping skills  Treatment Modality: Cognitive Behavioral Therapy and Skills Training  Interventions utilized were exploration, group exercise, and support  Purpose: maladaptive thinking and self-worth    Topic: Challenging our negative self-talk   Group was invited to identify an affirmation/mantra that is fairer/more positive. Group moved into smaller groups and discussed their negative self-talk pattern and then a more reasonable self-talk option. Then group came back into main session to discuss any takeaways. Last few minutes spent reflecting on group member departure/discharge and farewell.     Facilitated by Selina Gusman [facility]  Secondary HERI Ga   Supervised by Marleny Freedman [facility]-S, ATR      Name: Jesus Castañeda YOB: 1990   MR: [id number]    Edgar shared thinking about being more mindful of “it's not the worst thing” and build off of that. Edgar reframed a thought for another group member. He was attentive and participated fully.

## 2024-05-16 NOTE — GROUP NOTE
Group Topic: Feeling Awareness/Expression   Group Date: 5/13/2024  Start Time: 10:00 AM  End Time: 11:00 AM  Facilitators: Maria C Moreno PsyD   Department: [facility]    Number of Participants: 12   Group Focus: feeling awareness/expression  Treatment Modality: Cognitive Behavioral Therapy  Interventions utilized were patient education  Purpose: other: Self-awareness and personal logo    Date: 5/15/2024, Time: 10a-11a Number of Patients: 12, Username: [email or web address], Number of Staff: 2    This session was conducted via HIPAA compliant video App.netom telehealth platform. Patient was provided with informed consent. Group focused personal logo and self-awareness. Checked in about feelings. Engaged in self-awareness meditation. Discussed content related to self-awareness. Allowed members to share and reflect throughout group.    Primary Facilitator: Maria C Moreno PsyD  Secondary Facilitator - Janessa LIRIANO  Supervisor Marleny Freedman, [facility]-S, ATR    Name: Jesus Castañeda YOB: 1990   MR: [id number]      Edgar reports feeling content. Edgar relates to difficulty receiving feedback, acknowledges it can make someone feel like their best is not enough.

## 2024-05-16 NOTE — GROUP NOTE
Group Topic: Goals   Group Date: 5/16/2024  Start Time:  9:00 AM  End Time: 10:00 AM  Facilitators: SRI Dudley   Department: [facility]      This was a video IOP group on a HIPAA compliant platform. All patients were provided with informed consent.     Date: 5/16/24  Time: 9:00-10:00 am group  Number of Patients Present: 11  User Name: SRI Chavez, ATR  Number of Staff: 2    Group Topic(s): Weekly topic review/Weekend SMART goal setting    Pts first engaged in a feelings check-in. Next, pts collectively made a list of group IOP topics that were covered throughout the week. Pts were then introduced to and completed the SMART goals worksheet which included two goals in the following domains: achievement, enjoyment, and closeness to others, along with anticipated obstacles and coping skills they plan to utilize. Lastly, pts shared with the group.  IOP facilitators will check-in about SMART goals on Monday, 5/20 for accountability purposes and support. Pt. was present, attentive, and fully engaged in group discussion.     Facilitator: SRI Avendano, ATR  Secondary Facilitator: HERI Ga   Supervised by SRI Larry-S, ATR    Name: Jesus Castañeda YOB: 1990   MR: [id number]      Pt. shared goals including: spending time with family, working, going to a baseball game, going to his nephew's birthday party, and cleaning.   Pt. shared anticipated barriers/obstacles including: anxiety and depressive symptoms  Pt. shared coping strategies including: STOP, breathing

## 2024-05-16 NOTE — GROUP NOTE
Group Topic: Feeling Awareness/Expression   Group Date: 5/16/2024  Start Time: 10:00 AM  End Time: 11:00 AM  Facilitators: SRI Carolina   Department: [facility]    Number of Participants: 12   Group Focus: clarity of thought and other negative self talk  Treatment Modality: Cognitive Behavioral Therapy and Psychoeducation  Interventions utilized were exploration, group exercise, and patient education  Purpose: maladaptive thinking    Topic: Negative self-talk     Group discussed the origin of negative self- talk. Group watched two brief videos ( 2 mins in length) on the power of belief and how to challenge negative beliefs ( Dr. Miller). Group spent time discussing beliefs and perceptions. Group identified a negative thought/self-talk pattern.     Facilitated by Selina Gusman [facility]  Secondary HERI Ga   Supervised by Marleny Freedman [facility], ATR      Name: Jesus Castañeda YOB: 1990   MR: [id number]    Edgar shared beliefs/negative self-talk is a learned behavior that you carry along with you. When you “don't obtain a goal it build up on you that you're not achieving what you set out for yourself.”  He was actively engaged in group discussion.

## 2024-05-17 ENCOUNTER — DOCUMENTATION (OUTPATIENT)
Dept: BEHAVIORAL HEALTH | Facility: CLINIC | Age: 34
End: 2024-05-17
Payer: COMMERCIAL

## 2024-05-17 NOTE — GROUP NOTE
Group Topic: Coping Skills   Group Date: 5/15/2024  Start Time: 11:00 AM  End Time: 12:00 PM  Facilitators: Maria C Moreno PsyD   Department: [facility]    Number of Participants: 13   Group Focus: other Behavioral Activation  Treatment Modality: Cognitive Behavioral Therapy  Interventions utilized were group exercise  Purpose: other: Behavioral Activation      Date: 5/15/2024, Time: 11a-12p, Number of Patients: 13, Username: [email or web address], Number of Staff: 2    This session was conducted with HIPPA compliant video Zoom telehealth platform. Patient was provided with consent.  Group members identified types of activities that aligned with values, pleasure and mastery. They began creating action plans associated with these activities in order to practice implementing behavioral experiments.     Primary Facilitator: Maria C Moreno PsyD  Secondary Facilitator - Janessa LIRIANO  Supervisor Marleny Freedman, [facility]-S, ATR    Name: Jesus Castañeda YOB: 1990   MR: [id number]      Edgar worked on his activity schedule. He did not share with the group.

## 2024-05-17 NOTE — GROUP NOTE
Group Topic: Coping Skills   Group Date: 5/15/2024  Start Time: 10:00 AM  End Time: 11:00 AM  Facilitators: Maria C Moreno PsyD   Department: [facility]    Number of Participants: 13   Group Focus: other Behavioral Activation  Treatment Modality: Cognitive Behavioral Therapy  Interventions utilized were patient education  Purpose: other: Group focused on Behavioral Activation.    Date: 5/15/2024, Time: 10a-11a, Number of Patients: 10, Username: [email or web address], Number of Staff: 2    This session was conducted with HIPPA compliant video Zoom telehealth platform. Patient was provided with consent.  Group learned about behavioral activation. Group discussed the CBT triangle, the downward spiral/vicious cycle, were briefed on activity charting, and discussed values, interest, and mastery. Group members mapped out their own downward spirals and vicious cycles.    Primary Facilitator: Maria C Moreno PsyD  Secondary Facilitator - Janessa LIRIANO  Supervisor Marleny Freedman, [facility], ATR    Name: Jesus Castañeda YOB: 1990   MR: [id number]      Edgar identified his vicious cycle Identifies his wife wanting to discuss emotions and finances as a common event in his cycle that normally leads to conversation avoidance, arguments, and pushing his wife away.

## 2024-05-17 NOTE — PROGRESS NOTES
[place]   INTENSIVE OUTPATIENT PROGRAM MULTIDISCIPLINARY  TREATMENT PLAN & PROGRESS NOTE     Patient: Jesus Castañeda      : 1990  Age:      Student: No  Treatment Team Date: 24       MRN# [id number]   Nurse Practitioner: Malissa Loyola NP  Date of IOP Admission: 24    Ref by: Self              Week  1                      Admission Scores: MACHELLE 21: 17 RICARDO: 11 BDI: 18  PHQ-9: 11 MAST: 0 DAST: 0 MDQ: Positive      Current Risk Assessment:  Suicidal Risk:      None:    Ideation:  x     Plan:      Intent:      SI Plan with plan contracts for safety:     Hx of harming self:        Homicidal Risk:  None:  X Ideation:       Plan:      Intent:      HI Plan with means:                                     Hx of harming others:          Global Improvement    Clinical Global Impressions Rating Scale Of Illness:  6  1-No Illness Present   2-Minimal   3-Mild   4-Moderate   5-Marked   6-Severe  X 7-Very Severe     Diagnoses & ICD-10 Codes  Primary Diagnosis & Code: Bipolar Disorder, Current episode depressed; F31.30   Secondary Diagnosis & Code: Generalized Anxiety Disorder; F41.1     Psychosocial & Environmental Stressors:   Financial  insecurity/stress   Medication  Family/Home life Difficulties   Work Stress    Inadequacy of social support   Hx of trauma   Recent psychiatric inpatient discharge      Patient's Treatment Goals:            Date Initiated:            Progress Update:  5/10/2024  1.  Attend and actively participate in IOP _4_ days/week for 3 hours per day   2024  Good  X   Fair    Poor     Unclear   2.  Attend weekly medication management sessions and maintain compliance of medications  2024      Good  X  Fair     Poor     Unclear                                                        2.  Identify symptoms of diagnoses and develop coping plans    2024   Good  X  Fair     Poor     Unclear                                                 3.  Increase illness education and symptom insight                   5/6/2024  Good  X  Fair     Poor     Unclear        Current medications:  See EMR chart        Progress note:  _X_New to the IOP program, attending as planned  __Compliant, Progressing & Improving - Needs more time in IOP therapy program   __Compliant, Progressing & Improving Planning Discharge   __Compliant, Not Progressing or Improving: Reason  __Non-Compliant, not progressing or improving: Plan  __Other:     Insurance & Benefits: [facility] TRADITIONAL PLAN, IN NETWORK, BENEFITS ARE BASED ON MEDICAL NECESSITY ONLY: Unlimited visits, covers 85 % of the contracted rate after deductible has been met. No deductible for this plan. Once out of pocket maximum is met, plan pays 100%.    Co-Pay per day:     DEDUCTIBLE        Single:  / Family: / Accumulation:   Single:  /  Family: /   CO- INSURANCE  Single:  / Family:  / Accumulation:  Single:  /  Family: /    OUT OF POCKET  Single: 1,600.00 Family: 3,200.00 Accumulation: Single: 1,353.09 Family: 1,734.67         Total IOP Sessions completed & authorized to date:  4    Discharge plans have been discussed with patient & NP Malissa Loyola on:     Reason for discharge:    ___Successfully completed IOP treatment:   ___Pt. decided to seek treatment elsewhere:   ___Dropped out or no show more than three times:  ___Benefits exhausted:   ___Other:    Discharge date:                     Discharge Prognosis: Excellent      Good     Fair     Poor    Follow up appointment with: Physician:   Follow up appointment with: Therapist:    Follow up Aftercare group?  Yes   Patient provided with Crisis Hotline phone number for suicide prevention? Yes     Discharge Scores: Not Completed       Treatment plan electronically signed by Treatment Team:   SRI Pickett, CROW MARTIN NP, SFalguni Moreno, Psy ALICIA Shelton [facility]

## 2024-05-20 ENCOUNTER — TELEMEDICINE (OUTPATIENT)
Dept: BEHAVIORAL HEALTH | Facility: CLINIC | Age: 34
End: 2024-05-20
Payer: COMMERCIAL

## 2024-05-20 ENCOUNTER — CLINICAL SUPPORT (OUTPATIENT)
Dept: BEHAVIORAL HEALTH | Facility: CLINIC | Age: 34
End: 2024-05-20
Payer: COMMERCIAL

## 2024-05-20 DIAGNOSIS — F33.9 EPISODE OF RECURRENT MAJOR DEPRESSIVE DISORDER, UNSPECIFIED DEPRESSION EPISODE SEVERITY (CMS-HCC): ICD-10-CM

## 2024-05-20 DIAGNOSIS — F41.9 ANXIETY: ICD-10-CM

## 2024-05-20 DIAGNOSIS — F33.1 MODERATE EPISODE OF RECURRENT MAJOR DEPRESSIVE DISORDER (MULTI): ICD-10-CM

## 2024-05-20 DIAGNOSIS — F31.81 BIPOLAR 2 DISORDER (MULTI): ICD-10-CM

## 2024-05-20 DIAGNOSIS — F63.2 KLEPTOMANIA IN ADULT: ICD-10-CM

## 2024-05-20 PROCEDURE — 99213 OFFICE O/P EST LOW 20 MIN: CPT | Performed by: CLINICAL NURSE SPECIALIST

## 2024-05-20 PROCEDURE — 90853 GROUP PSYCHOTHERAPY: CPT | Mod: U2,95

## 2024-05-20 NOTE — PROGRESS NOTES
Outpatient Psychiatry      Subjective   Jesus Castañeda, is a 34 y.o. male seen in follow up today.     Assessment/Plan   Diagnoses:   Encounter Diagnoses   Name Primary?    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 No indication for medication changes today.      Making progress toward IOP treatment goals. Treatment plan reviewed with IOP team weekly.     Treatment Plan/Recommendations:   Continue aripiprazole 2 mg PO daily.   Continue escitalopram 10 mg PO daily.   Continue buspirone 5 mg PO BID.   Continue trazodone 50 mg PO at bedtime PRN for sleep.   Continue engagement in IOP for up to 4 days/week or 12 hours/week.   Follow-up in 1 week for medication management.       Reason for Visit:  IOP follow-up for medication management.     HPI:  Since his last visit,     Things have been up and down.   Set goals for himself over the weekend, and was able to accomplish them.   Dad visited from [place], went to baseball game together.     Doing better with impulsive spending.   Recognizing the financial strain that spending even small amounts impulsively over time can put on his family.     More able to feel emotions recently.   Initially when he started aripiprazole felt kind of numb, but seems like over time that side effect has waned.    Anxiety still fairly low.   No significant irritability.   No changes in sleep or energy level.     Denies suicidal ideation or a passive death wish.     No new medications or health problems.       Current Psychiatric Medications:  Lexapro 10 mg PO daily   Buspirone 10 mg PO BID  Abilify 2 mg PO daily  Trazodone 50 mg PO at bedtime PRN for sleep      Record Review: brief     Medical Review Of Systems:  Pertinent items are noted in HPI.    Psychiatric Review Of Systems:  As noted in HPI.        Objective  "  Mental Status Exam   General Appearance: Well groomed, appropriate eye contact  Attitude/Behavior: Cooperative  Motor: No psychomotor agitation or retardation, no tremor or other abnormal movements  Speech: Normal rate, volume, prosody  Gait/Station: Other:(comment) (not observed- virtual)  Mood: \"Pretty good..\"  Affect: Constricted, Congruent with mood and topic of conversation  Thought Process: Linear, goal directed  Thought Associations: No loosening of associations  Thought Content: Other: (comment) (some ongoing anxious themes.)  Perception: No perceptual abnormalities noted  Sensorium: Alert and oriented to person, place, time and situation  Insight: Intact  Judgement: Intact  Cognition: Cognitively intact to conversational testing with respect to attention, orientation, fund of knowledge, recent and remote memory, and language      Vitals:  There were no vitals filed for this visit.        Malissa Loyola, APRN-CNP, APRN-CNS   "

## 2024-05-20 NOTE — GROUP NOTE
Group Topic: Goals   Group Date: 5/20/2024  Start Time:  9:00 AM  End Time: 10:00 AM  Facilitators: Selina Gusman [facility]   Department: [facility]    Number of Participants: 11   Group Focus: check in and goals  Treatment Modality: Cognitive Behavioral Therapy  Interventions utilized were support  Purpose: feelings and other: SMART Goal check in   Topic: Check in and smart goal    Group members set SMART goals on Thursday; group spent this time checking in on goals and weekend. Time spent processing as needed.     Facilitated by Selina Gusman [facility]   Secondary Facilitation HERI Dozier   Supervised by [facility]    Name: Jesus Castañeda YOB: 1990   MR: [id number]    Edgar accomplished all goals. Cleaned bathroom, mowed the lawn, and all went to baseball game this weekend. Utilized communication to manage family conflict.

## 2024-05-21 ENCOUNTER — CLINICAL SUPPORT (OUTPATIENT)
Dept: BEHAVIORAL HEALTH | Facility: CLINIC | Age: 34
End: 2024-05-21
Payer: COMMERCIAL

## 2024-05-21 DIAGNOSIS — F33.1 MODERATE EPISODE OF RECURRENT MAJOR DEPRESSIVE DISORDER (MULTI): ICD-10-CM

## 2024-05-21 DIAGNOSIS — F41.1 GENERALIZED ANXIETY DISORDER: ICD-10-CM

## 2024-05-21 PROCEDURE — 90853 GROUP PSYCHOTHERAPY: CPT | Mod: U2,95

## 2024-05-21 NOTE — GROUP NOTE
Group Topic: Coping Skills   Group Date: 5/21/2024  Start Time:  9:00 AM  End Time: 10:00 AM  Facilitators: SRI Carolina   Department: [facility]    Number of Participants:  11    Group Focus: other gratitude and letter writing  Treatment Modality: Psychoeducation and Other: writing  Interventions utilized were group exercise, story telling, and support  Purpose: feelings    Topic: Gratitude and Writing   This group discussed the research supporting a gratitude practice. They learned about how a practice of appreciating and focusing on thankfulness can benefit them. Then group spent 6  minutes writing about a person that has influenced them positively ( living or dead). Group watched a brief video demonstrating the activity and results of sharing the writing. Group reflected on their personal reactions to the activity.     Facilitated by Selina Gusman [facility]    Name: Jesus Castañeda YOB: 1990   MR: [id number]    Edgar found it hard to write positively. He did it and wrote about his wife. Once he started it was easier but hard to get started. He followed along in group.

## 2024-05-21 NOTE — GROUP NOTE
Group Topic: Feeling Awareness/Expression   Group Date: 5/21/2024  Start Time: 11:00 AM  End Time: 12:00 PM  Facilitators: SRI Rosado; SRI Carolina   Department: [facility]    This was a video IOP group on a HIPAA compliant program. All patients were provided with informed consent.     Date: 5/21/2024, Time: 11a-12p, Number of Patients: 10, Username: [email or web address], Number of Staff: 1  Group Topic(s): cognitive distortions. The group engaged in exploring the definition of cognitive distortion. The group then explored examples of cognitive distortions including emotional reasoning, disqualifying the positive, should statements, and all-or-nothing thinking. Patient explored coping skills and the RAP strategy for challenging distortions. Group discussion followed.     Name: Jesus Castañeda YOB: 1990   MR: [id number]      Edgar was off camera throughout exercise but engaged verbally in group exercise. Patient identified with the distortion of all or nothing thinking.  SRI Thompson

## 2024-05-21 NOTE — GROUP NOTE
Group Topic: Feeling Awareness/Expression   Group Date: 5/21/2024  Start Time: 10:00 AM  End Time: 11:00 AM  Facilitators: SRI Rosado; SRI Carolina   Department: [facility]    This was a video IOP group on a HIPAA compliant program. All patients were provided with informed consent.     Date: 5/21/2024, Time: 10-11a, Number of Patients: 11, Username: [email or web address], Number of Staff: 1  Group Topic(s): cognitive distortions. The group engaged in exploring the definition of cognitive distortion. The group then explored examples of cognitive distortions including magnification, minimization, catastrophizing, overgeneralizing, magical thinking, personalization, jumping to conclusions, mind reading, and fortune telling. Group discussion followed.      Name: Jesus Castañeda YOB: 1990   MR: [id number]      Edgar was off camera throughout group yet engaged fully in group exercise and discussion. Patient expressed struggling with distortions of catastrophizing. Patient shared often jumping to conclusions and catastrophizing when arguing with his wife.   SRI Thompson

## 2024-05-22 ENCOUNTER — CLINICAL SUPPORT (OUTPATIENT)
Dept: BEHAVIORAL HEALTH | Facility: CLINIC | Age: 34
End: 2024-05-22
Payer: COMMERCIAL

## 2024-05-22 DIAGNOSIS — F33.1 MODERATE EPISODE OF RECURRENT MAJOR DEPRESSIVE DISORDER (MULTI): ICD-10-CM

## 2024-05-22 DIAGNOSIS — F41.9 ANXIETY: ICD-10-CM

## 2024-05-22 PROCEDURE — 90853 GROUP PSYCHOTHERAPY: CPT | Mod: U2,95

## 2024-05-22 NOTE — GROUP NOTE
Group Topic: Coping Skills   Group Date: 5/22/2024  Start Time: 10:00 AM  End Time: 11:00 AM  Facilitators: CHASIDY Murphy   Department: [facility]    This was a video IOP group on a HIPAA compliant platform. All patients were provided with informed consent.     Date: 5/22/2024, Time: 10a-11a, Number of Patients: 12, User Name: [email or web address],  Number of Staff: 1  Group topic(s): Communication styles/interpersonal effectiveness (DBT). Group members discussed qualities of passive, aggressive, passive-aggressive and assertive communication. Group received psychoeducation about the DBT DEAR MAN skill regarding making an assertive request or assertively declining a request.    MARELY Shaver CT  Supervised by SRI Larry-S, ATR    Name: Jesus Castañeda YOB: 1990   MR: [id number]      Edgar participated via the chat and was mostly off camera. He acknowledged he has a history of using passive aggressive statements.  CHASIDY Murphy

## 2024-05-22 NOTE — GROUP NOTE
Group Topic: Coping Skills   Group Date: 5/22/2024  Start Time: 11:00 AM  End Time: 12:00 PM  Facilitators: CHASIDY Murphy   Department: [facility]    Number of Participants: 12  Group Focus: communication  Treatment Modality: Psychoeducation and Skills Training  Interventions utilized were group exercise, patient education, and support  Purpose: coping skills and communication skills    Patients received further psychoeducation on the DBT interpersonal effectiveness skills DEAR MAN and GIVE. Patients created individualized DEAR MAN scripts and went into breakout rooms to discuss and support each other with their scripts.  CHASIDY Murphy, Primary Facilitator  HERI Dozier, Secondary Facilitator  Supervised by SRI Larry-S, ATR    Name: Jesus Castañeda YOB: 1990   MR: [id number]      Edgar was present and often on camera during this group. He did participate in the small group discussion.  CHASIDY Murphy

## 2024-05-22 NOTE — GROUP NOTE
Group Topic: Community   Group Date: 5/22/2024  Start Time:  9:00 AM  End Time: 10:00 AM  Facilitators: SRI Carolina   Department: [facility]    Number of Participants: 12   Group Focus: healthy friendships  Treatment Modality: Patient-Centered Therapy and Psychoeducation  Interventions utilized were assignment and group exercise  Purpose: insight or knowledge and other: identification of healthy support     Topic: Healthy vs. Unhealthy Support     Group began by checking in on how they were feeling using one word.  Group was offered the option of topics for this morning's focus. Healthy vs. Unhealthy support was selected/majority. Group discussed what healthy support is/defined support. Then group split into smaller groups and discussed traits that would be considered healthy, neutral, or unhealthy support. Time spent in small group discussion. Group returned to large/main session and created a list of traits.     Facilitated by SRI Chavez   Secondary Facilitation: Janessa Pedro, [place]   Supervised by SRI Larry-S, ATR      Name: Jesus Castañeda YOB: 1990   MR: [id number]    Edgar was off camera; he was driving from a Dr. Appointment.  He did contribute to the discussion of support and healthy support and what that means. Edgar shared Healthy supports include people who are strong, predictable, open, patient. Unhealthy supports include aggressive people, one-sided relationships, unhealthy impulsive routines.

## 2024-05-23 ENCOUNTER — APPOINTMENT (OUTPATIENT)
Dept: BEHAVIORAL HEALTH | Facility: CLINIC | Age: 34
End: 2024-05-23
Payer: COMMERCIAL

## 2024-05-23 ENCOUNTER — CLINICAL SUPPORT (OUTPATIENT)
Dept: BEHAVIORAL HEALTH | Facility: CLINIC | Age: 34
End: 2024-05-23
Payer: COMMERCIAL

## 2024-05-23 DIAGNOSIS — F41.9 ANXIETY: ICD-10-CM

## 2024-05-23 DIAGNOSIS — F33.1 MODERATE EPISODE OF RECURRENT MAJOR DEPRESSIVE DISORDER (MULTI): ICD-10-CM

## 2024-05-23 PROCEDURE — 90853 GROUP PSYCHOTHERAPY: CPT | Mod: U2,95

## 2024-05-23 NOTE — GROUP NOTE
Group Topic: Feeling Awareness/Expression   Group Date: 5/20/2024  Start Time: 11:00 AM  End Time: 12:00 PM  Facilitators: SRI Dudley   Department: [facility]    This was a video IOP group on a HIPAA compliant platform. All patients were provided with informed consent.       Date: 5/20/24  Time: 11:00 am-12:00 pm  Number of Patients Present:   Number of Staff: 2  User Name: SRI Chavez, ATR    Group Topic(s): Gratitude      Pts were introduced to the why I'm grateful activity which included the following writing prompts:  1. I am grateful for my family because…   2. I am grateful for my friendship with…  3. Something good that happened this week…  4. I am grateful for who I am because…  5. Something silly I am grateful for…  6. Something else I am grateful for…    Pts completed the worksheet and shared with the group. Pt. was present, attentive, and fully engaged in group discussion.     Facilitator: SRI Avendano, ATR  Secondary Facilitator: Janessa Pedro, [place]  Supervised by SRI Larry-S, ATR    Name: Jesus Castañeda YOB: 1990   MR: [id number]

## 2024-05-23 NOTE — GROUP NOTE
Group Topic: Goals   Group Date: 5/23/2024  Start Time:  9:00 AM  End Time: 10:00 AM  Facilitators: SRI Dudley   Department: [facility]    This was a video IOP group on a HIPAA compliant platform. All patients were provided with informed consent.     Date: 5/23/24  Time: 9:00-10:00 am group  Number of Patients Present: 11  User Name: SRI Chavez, ATR  Number of Staff: 2    Group Topic(s): Weekly topic review/Weekend SMART goal setting    Pts first engaged in a feelings check-in. Next, pts collectively made a list of group IOP topics that were covered throughout the week. Pts were then introduced to and completed the SMART goals worksheet which included two goals in the following domains: achievement, enjoyment, and closeness to others, along with anticipated obstacles and coping skills they plan to utilize. Lastly, pts shared with the group.  IOP facilitators will check-in about SMART goals on Tuesday, 5/28 for accountability purposes and support. Pt. was present, attentive, and fully engaged in group discussion.     Facilitator: SRI Avendano, ATR  Secondary Facilitator: HERI Ga   Supervised by SRI Larry-S, ATR    Name: Jesus Castañeda YOB: 1990   MR: [id number]      Pt. shared goals including: working, going to bed at 10 pm, and golfing   Pt. shared anticipated barriers/obstacles including: work/managing symptoms   Pt. shared coping strategies including: deep breathing, spending time with social supports

## 2024-05-23 NOTE — GROUP NOTE
Group Topic: Feeling Awareness/Expression   Group Date: 5/20/2024  Start Time: 10:00 AM  End Time: 11:00 AM  Facilitators: SRI Dudley   Department: [facility]    This was a video IOP group on a HIPAA compliant platform. All patients were provided with informed consent.     Date: 5/20/2024  Time: 10:00-11:00 am group  Number of Patients Present: 11  Number of Staff: 2  User Name: SRI Chavez, ATR    Group Topic(s): Weekly Intention Setting/Meditation/Psychoeducation on Gratitude     Pt. first set and shared an intention for the week. Next, pt. engaged in a 10-minute meditation. Lastly, pt. received psychoeducation on gratitude. Pt. was present, attentive, and fully engaged in group discussion.     Facilitator: SRI Avendano, ATR  Secondary Facilitator: Janessa Pedro, [place]  Supervised by SRI Larry-S, ATR    Name: Jesus Castañeda YOB: 1990   MR: [id number]      Edgar set an intention to stick with his budget this week.

## 2024-05-24 ENCOUNTER — DOCUMENTATION (OUTPATIENT)
Dept: BEHAVIORAL HEALTH | Facility: CLINIC | Age: 34
End: 2024-05-24
Payer: COMMERCIAL

## 2024-05-24 NOTE — PROGRESS NOTES
[place]   INTENSIVE OUTPATIENT PROGRAM MULTIDISCIPLINARY  TREATMENT PLAN & PROGRESS NOTE     Patient: Jesus Castañeda      : 1990  Age:      Student: No  Treatment Team Date: 24       MRN# [id number]   Nurse Practitioner: Malissa Loyola NP  Date of IOP Admission: 24    Ref by: Self              Week  2                    Admission Scores: MACHELLE 21: 17 RICARDO: 11 BDI: 18  PHQ-9: 11 MAST: 0 DAST: 0 MDQ: Positive      Current Risk Assessment:  Suicidal Risk:      None:    Ideation:  x     Plan:      Intent:      SI Plan with plan contracts for safety:     Hx of harming self:        Homicidal Risk:  None:  X Ideation:       Plan:      Intent:      HI Plan with means:                                     Hx of harming others:          Global Improvement    Clinical Global Impressions Rating Scale Of Illness:  5  1-No Illness Present   2-Minimal   3-Mild   4-Moderate   5-Marked  x 6-Severe  7-Very Severe     Diagnoses & ICD-10 Codes  Primary Diagnosis & Code: Bipolar Disorder, Current episode depressed; F31.30   Secondary Diagnosis & Code: Generalized Anxiety Disorder; F41.1     Psychosocial & Environmental Stressors:   Financial  insecurity/stress   Medication  Family/Home life Difficulties   Work Stress    Inadequacy of social support   Hx of trauma   Recent psychiatric inpatient discharge      Patient's Treatment Goals:            Date Initiated:            Progress Update:  2024  1.  Attend and actively participate in IOP _4_ days/week for 3 hours per day   2024  Good  X   Fair    Poor     Unclear   2.  Attend weekly medication management sessions and maintain compliance of medications  2024      Good  X  Fair     Poor     Unclear                                                        2.  Identify symptoms of diagnoses and develop coping plans    2024   Good  X  Fair     Poor     Unclear                                                 3.  Increase illness education and symptom insight                   5/6/2024  Good  X  Fair     Poor     Unclear        Current medications:  See EMR chart        Progress note:  __New to the IOP program, attending as planned  _X_Compliant, Progressing & Improving - Needs more time in IOP therapy program   __Compliant, Progressing & Improving Planning Discharge   __Compliant, Not Progressing or Improving: Reason  __Non-Compliant, not progressing or improving: Plan  __Other:     Insurance & Benefits: [facility] TRADITIONAL PLAN, IN NETWORK, BENEFITS ARE BASED ON MEDICAL NECESSITY ONLY: Unlimited visits, covers 85 % of the contracted rate after deductible has been met. No deductible for this plan. Once out of pocket maximum is met, plan pays 100%.    Co-Pay per day:     DEDUCTIBLE        Single:  / Family: / Accumulation:   Single:  /  Family: /   CO- INSURANCE  Single:  / Family:  / Accumulation:  Single:  /  Family: /    OUT OF POCKET  Single: 1,600.00 Family: 3,200.00 Accumulation: Single: 1,353.09 Family: 1,734.67         Total IOP Sessions completed & authorized to date:  8    Discharge plans have been discussed with patient & NP Malissa Loyola on:     Reason for discharge:    ___Successfully completed IOP treatment:   ___Pt. decided to seek treatment elsewhere:   ___Dropped out or no show more than three times:  ___Benefits exhausted:   ___Other:    Discharge date:                     Discharge Prognosis: Excellent      Good     Fair     Poor    Follow up appointment with: Physician:   Follow up appointment with: Therapist:    Follow up Aftercare group?  Yes   Patient provided with Crisis Hotline phone number for suicide prevention? Yes     Discharge Scores: Not Completed       Treatment plan electronically signed by Treatment Team:   SRI Pickett, CROW MARTIN NP, SFalguni Moreno, Psy ALICIA Shelton LPCC

## 2024-05-28 ENCOUNTER — APPOINTMENT (OUTPATIENT)
Dept: BEHAVIORAL HEALTH | Facility: CLINIC | Age: 34
End: 2024-05-28
Payer: COMMERCIAL

## 2024-05-29 ENCOUNTER — CLINICAL SUPPORT (OUTPATIENT)
Dept: BEHAVIORAL HEALTH | Facility: CLINIC | Age: 34
End: 2024-05-29
Payer: COMMERCIAL

## 2024-05-29 DIAGNOSIS — F41.9 ANXIETY: ICD-10-CM

## 2024-05-29 DIAGNOSIS — F33.1 MODERATE EPISODE OF RECURRENT MAJOR DEPRESSIVE DISORDER (MULTI): ICD-10-CM

## 2024-05-29 PROCEDURE — 90853 GROUP PSYCHOTHERAPY: CPT | Mod: 95 | Performed by: COUNSELOR

## 2024-05-29 NOTE — PROGRESS NOTES
[place]   INTENSIVE OUTPATIENT PROGRAM MULTIDISCIPLINARY  TREATMENT PLAN & PROGRESS NOTE     Patient: Jesus Castañeda      : 1990  Age:      Student: No  Treatment Team Date: 24       MRN# [id number]   Nurse Practitioner: Malissa Loyola NP  Date of IOP Admission: 24    Ref by: Self              Week  3         Admission Scores: MACHELLE 21: 17 RICARDO: 11 BDI: 18  PHQ-9: 11 MAST: 0 DAST: 0 MDQ: Positive      Current Risk Assessment:  Suicidal Risk:      None:    Ideation:  x     Plan:      Intent:      SI Plan with plan contracts for safety:     Hx of harming self:        Homicidal Risk:  None:  X Ideation:       Plan:      Intent:      HI Plan with means:                                     Hx of harming others:          Global Improvement    Clinical Global Impressions Rating Scale Of Illness:  5  1-No Illness Present   2-Minimal   3-Mild   4-Moderate   5-Marked  x 6-Severe  7-Very Severe     Diagnoses & ICD-10 Codes  Primary Diagnosis & Code: Bipolar Disorder, Current episode depressed; F31.30   Secondary Diagnosis & Code: Generalized Anxiety Disorder; F41.1     Psychosocial & Environmental Stressors:   Financial  insecurity/stress   Medication  Family/Home life Difficulties   Work Stress    Inadequacy of social support   Hx of trauma   Recent psychiatric inpatient discharge      Patient's Treatment Goals:            Date Initiated:            Progress Update:  2024  1.  Attend and actively participate in IOP _4_ days/week for 3 hours per day   2024  Good  X   Fair    Poor     Unclear   2.  Attend weekly medication management sessions and maintain compliance of medications  2024      Good  X  Fair     Poor     Unclear                                                        2.  Identify symptoms of diagnoses and develop coping plans    2024   Good  X  Fair     Poor     Unclear                                                 3.  Increase illness education and symptom insight                   5/6/2024  Good  X  Fair     Poor     Unclear        Current medications:  See EMR chart        Progress note:  __New to the IOP program, attending as planned  _X_Compliant, Progressing & Improving - Needs more time in IOP therapy program   __Compliant, Progressing & Improving Planning Discharge   __Compliant, Not Progressing or Improving: Reason  __Non-Compliant, not progressing or improving: Plan  __Other:     Insurance & Benefits: [facility] TRADITIONAL PLAN, IN NETWORK, BENEFITS ARE BASED ON MEDICAL NECESSITY ONLY: Unlimited visits, covers 85 % of the contracted rate after deductible has been met. No deductible for this plan. Once out of pocket maximum is met, plan pays 100%.    Co-Pay per day:     DEDUCTIBLE        Single:  / Family: / Accumulation:   Single:  /  Family: /   CO- INSURANCE  Single:  / Family:  / Accumulation:  Single:  /  Family: /    OUT OF POCKET  Single: 1,600.00 Family: 3,200.00 Accumulation: Single: 1,353.09 Family: 1,734.67         Total IOP Sessions completed & authorized to date:  12    Discharge plans have been discussed with patient & NP Malissa Loyola on:     Reason for discharge:    ___Successfully completed IOP treatment:   ___Pt. decided to seek treatment elsewhere:   ___Dropped out or no show more than three times:  ___Benefits exhausted:   ___Other:    Discharge date:                     Discharge Prognosis: Excellent      Good     Fair     Poor    Follow up appointment with: Physician:   Follow up appointment with: Therapist:    Follow up Aftercare group?  Yes   Patient provided with Crisis Hotline phone number for suicide prevention? Yes     Discharge Scores: Not Completed       Treatment plan electronically signed by Treatment Team:   SRI Pickett, CROW MARTIN NP, SFalguni Moreno, Psy ALICIA Shelton LPCC

## 2024-05-30 ENCOUNTER — CLINICAL SUPPORT (OUTPATIENT)
Dept: BEHAVIORAL HEALTH | Facility: CLINIC | Age: 34
End: 2024-05-30
Payer: COMMERCIAL

## 2024-05-30 DIAGNOSIS — F41.9 ANXIETY: ICD-10-CM

## 2024-05-30 DIAGNOSIS — F33.1 MODERATE EPISODE OF RECURRENT MAJOR DEPRESSIVE DISORDER (MULTI): ICD-10-CM

## 2024-05-30 PROCEDURE — 90853 GROUP PSYCHOTHERAPY: CPT | Mod: 95 | Performed by: COUNSELOR

## 2024-05-30 NOTE — GROUP NOTE
Group Topic: Loss and Grief Issues   Group Date: 5/29/2024  Start Time:  9:00 AM  End Time: 10:00 AM  Facilitators: SRI Carolina   Department: [facility]    Number of Participants: 12   Group Focus: loss/grief issues  Treatment Modality: Psychoeducation  Interventions utilized were exploration, patient education, and support  Purpose: insight or knowledge    Topic: Grief + Loss   Group discussed their associations with the topic of grief and loss. Then group spent time learning about common grief reactions. Time spent discussing grief models from Palak Morrison and Ananth's dual process of grief. Discussion of how one processes grief/expresses grief also discussed. Time spent answering questions and in reflection.     Facilitated by SRI Chavez   Secondary Facilitation: Janessa Pedro, [place]   Supervised by SRI Larry-S, ATR      Name: Jesus Castañeda YOB: 1990   MR: [id number]    Edgar described sensations of grief/emotion of grief as confused, shutdown, hopeless. He seemed to present with a resistance to the idea of grief describing it as ruining.

## 2024-05-30 NOTE — GROUP NOTE
Group Topic: Goals   Group Date: 5/30/2024  Start Time:  9:00 AM  End Time: 10:00 AM  Facilitators: SRI Dudley   Department: [facility]     This was a video IOP group on a HIPAA compliant platform. All patients were provided with informed consent.     Date: 5/30/24  Time: 9:00-10:00 am group  Number of Patients Present: 11  User Name: SRI Chavez, ATR  Number of Staff: 1    Group Topic(s): Weekly topic review/Weekend SMART goal setting    Pts first engaged in a feelings check-in. Next, pts collectively made a list of group IOP topics that were covered throughout the week. Pts were then introduced to and completed the SMART goals worksheet which included two goals in the following domains: achievement, enjoyment, and closeness to others, along with anticipated obstacles and coping skills they plan to utilize. Lastly, pts shared with the group. [facility] facilitators will check-in about SMART goals on Monday, 6/3 for accountability purposes and support. Pt. was present, attentive, and fully engaged in group discussion.     Facilitator: SRI Avendano, ATR    Name: Jesus Castañeda YOB: 1990   MR: [id number]        Pt. reported feeling confused.   Pt. shared goals including: organizing and cleaning, mowing the grass, visiting with a friend, golfing, and baking.  Pt. shared anticipated barriers/obstacles including: weather, time management   Pt. shared coping strategies including: practicing self-compassion and deep breathing.

## 2024-05-30 NOTE — GROUP NOTE
Group Topic: Personal Responsibility   Group Date: 5/30/2024  Start Time: 11:00 AM  End Time: 12:00 PM  Facilitators: SRI Carolina   Department: [facility]    Number of Participants: 11   Group Focus: other barriers to recovery and problem solving  Treatment Modality: Cognitive Behavioral Therapy and Psychoeducation  Interventions utilized were exploration, group exercise, problem solving, and support  Purpose: coping skills, feelings, and other: identification of barriers    Name: Jesus Castañeda YOB: 1990   MR: [id number]    Edgar mentioned addiction and chemical imbalance. He shared being able to identify barriers is helpful and then implementing them can be hard. Edgar related to another on feeling like he has no way out.

## 2024-05-30 NOTE — GROUP NOTE
Group Topic: Reminiscence   Group Date: 5/30/2024  Start Time: 10:00 AM  End Time: 11:00 AM  Facilitators: SRI Carolina   Department: [facility]    Number of Participants: 12   Group Focus: check in, clarity of thought, community group, and other progress in treatment/reflection of treatment  Treatment Modality: Patient-Centered Therapy and Other: writing/journaling  Interventions utilized were assignment, reminiscence, story telling, and support  Purpose: communication skills, insight or knowledge, and relapse prevention strategies    Topic: Time in group + reflective writing   Group was provided questions to prompt reflection of their time in IOP. Questions included what has been learned, how they feel about their time in group, any regrets and future direction of treatment/life. Time spent sharing writing and connecting with other group members.     Facilitated by SRI Chavez         Name: Jesus Castañeda YOB: 1990   MR: [id number]    Edgar was on camera and followed along. He did not share about his writing. He did support others.

## 2024-05-31 ENCOUNTER — DOCUMENTATION (OUTPATIENT)
Dept: BEHAVIORAL HEALTH | Facility: CLINIC | Age: 34
End: 2024-05-31
Payer: COMMERCIAL

## 2024-05-31 RX ORDER — ONDANSETRON 4 MG/1
4 TABLET, ORALLY DISINTEGRATING ORAL EVERY 8 HOURS PRN
COMMUNITY
Start: 2024-02-02

## 2024-05-31 RX ORDER — CHOLECALCIFEROL (VITAMIN D3) 50 MCG
2000 TABLET ORAL DAILY
COMMUNITY
Start: 2024-03-25

## 2024-05-31 RX ORDER — METHYLPREDNISOLONE 4 MG/1
TABLET ORAL
COMMUNITY

## 2024-05-31 RX ORDER — METHYLPREDNISOLONE 4 MG/1
TABLET ORAL
COMMUNITY
Start: 2021-02-23

## 2024-05-31 RX ORDER — METHOCARBAMOL 750 MG/1
TABLET, FILM COATED ORAL EVERY 6 HOURS
COMMUNITY
Start: 2021-02-23

## 2024-05-31 NOTE — GROUP NOTE
Group Topic: Goals   Group Date: 5/29/2024  Start Time: 11:00 AM  End Time: 12:00 PM  Facilitators: Maria C Moreno PsyD   Department: [facility]    Number of Participants: 12   Group Focus: Sleep Hygiene and Wellness Planning  Treatment Modality: Cognitive Behavioral Therapy  Interventions utilized were group exercise  Purpose: Focus on sleep hygiene and developing a wellness plan.    Date: 5/29/2024, Time: 11a-12p Number of Patients: 12, Username: [email or web address], Number of Staff: 2    This session was conducted via HIPAA compliant video [employer] telehealth platform. Patient was provided with informed consent. Group focused personal wellness plans. Group members created a personal wellness plan. Group members developed strengths, areas for growth, and goals.    Primary Facilitator: Maria C Moreno PsyD  Secondary Facilitator - Janessa LIRIANO  Supervisor Marleny Freedman, [facility]-S, ATR    Name: Jesus Castañeda YOB: 1990   MR: [id number]      Edgar appeared attentive but did not share his wellness plan.

## 2024-05-31 NOTE — GROUP NOTE
Group Topic: Goals   Group Date: 5/29/2024  Start Time: 10:00 AM  End Time: 11:00 AM  Facilitators: Maria C Moreno PsyD   Department: [facility]    Number of Participants: 12   Group Focus: other Sleep Hygiene and Wellness Planning  Treatment Modality: Cognitive Behavioral Therapy  Interventions utilized were patient education  Purpose: other: Group focused on sleep hygiene and wellness planning for future.    Date: 5/29/2024, Time: 10a-11a Number of Patients: 12, Username: [email or web address], Number of Staff: 2    This session was conducted with HIPPA compliant video Zoom telehealth platform. Patient was provided with consent. Group focused on sleep hygiene. Started with a meditation on habits and changing old habits. Provided psychoeducation on the definition of sleep hygiene. Provided examples of healthy ways to initiate sleep. Identified unhealthy patterns that may have a negative impact on sleep. Group members shared about their sleep habits.     Primary Facilitator: Maria C Moreon PsyD  Secondary Facilitator - Janessa LIRIANO  Supervisor Marleny Freedman [facility], ATR    Name: Jesus Castañeda YOB: 1990   MR: [id number]      Edgar reports that he needs to stop telling himself he deserves the bad things that happen to him.  He reports wanting to work on forming a routine to improve sleep hygiene.   Left message for pt to call back

## 2024-06-03 ENCOUNTER — APPOINTMENT (OUTPATIENT)
Dept: BEHAVIORAL HEALTH | Facility: CLINIC | Age: 34
End: 2024-06-03
Payer: COMMERCIAL

## 2024-06-03 ENCOUNTER — CLINICAL SUPPORT (OUTPATIENT)
Dept: BEHAVIORAL HEALTH | Facility: CLINIC | Age: 34
End: 2024-06-03
Payer: COMMERCIAL

## 2024-06-03 DIAGNOSIS — F33.1 MODERATE EPISODE OF RECURRENT MAJOR DEPRESSIVE DISORDER (MULTI): ICD-10-CM

## 2024-06-03 DIAGNOSIS — F41.9 ANXIETY: ICD-10-CM

## 2024-06-03 PROCEDURE — 90853 GROUP PSYCHOTHERAPY: CPT | Mod: U2,95

## 2024-06-03 NOTE — PROGRESS NOTES
[facility]   INTENSIVE OUTPATIENT PROGRAM MULTIDISCIPLINARY  TREATMENT PLAN & PROGRESS NOTE     Patient: Jesus Castañeda      : 1990  Age:      Student: No  Treatment Team Date: 24       MRN# [id number]   Nurse Practitioner: Malissa Loyola NP  Date of IOP Admission: 24    Ref by: Self              Week  4        Admission Scores: MACHELLE 21: 17 RICARDO: 11 BDI: 18  PHQ-9: 11 MAST: 0 DAST: 0 MDQ: Positive      Current Risk Assessment:  Suicidal Risk:      None:    Ideation:  x     Plan:      Intent:      SI Plan with plan contracts for safety:     Hx of harming self:        Homicidal Risk:  None:  X Ideation:       Plan:      Intent:      HI Plan with means:                                     Hx of harming others:          Global Improvement    Clinical Global Impressions Rating Scale Of Illness:  5  1-No Illness Present   2-Minimal   3-Mild   4-Moderate   5-Marked  x 6-Severe  7-Very Severe     Diagnoses & ICD-10 Codes  Primary Diagnosis & Code: Bipolar Disorder, Current episode depressed; F31.30   Secondary Diagnosis & Code: Generalized Anxiety Disorder; F41.1     Psychosocial & Environmental Stressors:   Financial  insecurity/stress   Medication  Family/Home life Difficulties   Work Stress    Inadequacy of social support   Hx of trauma   Recent psychiatric inpatient discharge      Patient's Treatment Goals:            Date Initiated:            Progress Update:  2024  1.  Attend and actively participate in IOP _4_ days/week for 3 hours per day   2024  Good  X   Fair    Poor     Unclear   2.  Attend weekly medication management sessions and maintain compliance of medications  2024      Good  X  Fair     Poor     Unclear                                                        2.  Identify symptoms of diagnoses and develop coping plans    2024   Good  X  Fair     Poor     Unclear                                                 3.  Increase illness education and symptom insight                   5/6/2024  Good  X  Fair     Poor     Unclear        Current medications:  See EMR chart        Progress note:  __New to the IOP program, attending as planned  _X_Compliant, Progressing & Improving - Needs more time in IOP therapy program   __Compliant, Progressing & Improving Planning Discharge   __Compliant, Not Progressing or Improving: Reason  __Non-Compliant, not progressing or improving: Plan  __Other:     Insurance & Benefits: [facility] TRADITIONAL PLAN, IN NETWORK, BENEFITS ARE BASED ON MEDICAL NECESSITY ONLY: Unlimited visits, covers 85 % of the contracted rate after deductible has been met. No deductible for this plan. Once out of pocket maximum is met, plan pays 100%.    Co-Pay per day:     DEDUCTIBLE        Single:  / Family: / Accumulation:   Single:  /  Family: /   CO- INSURANCE  Single:  / Family:  / Accumulation:  Single:  /  Family: /    OUT OF POCKET  Single: 1,600.00 Family: 3,200.00 Accumulation: Single: 1,353.09 Family: 1,734.67         Total IOP Sessions completed & authorized to date:  14    Discharge plans have been discussed with patient & NP Malissa Loyola on:     Reason for discharge:    ___Successfully completed IOP treatment:   ___Pt. decided to seek treatment elsewhere:   ___Dropped out or no show more than three times:  ___Benefits exhausted:   ___Other:    Discharge date:                     Discharge Prognosis: Excellent      Good     Fair     Poor    Follow up appointment with: Physician:   Follow up appointment with: Therapist:    Follow up Aftercare group?  Yes   Patient provided with Crisis Hotline phone number for suicide prevention? Yes     Discharge Scores: Not Completed       Treatment plan electronically signed by Treatment Team:   SRI Pickett, CROW MARTIN NP, SFalguni Moreno, Psy ALICIA Shelton LPCC

## 2024-06-03 NOTE — GROUP NOTE
Group Topic: Feeling Awareness/Expression   Group Date: 6/3/2024  Start Time: 10:00 AM  End Time: 11:00 AM  Facilitators: SRI Dudley   Department: [facility]    This was a video IOP group on a HIPAA compliant platform. All patients were provided with informed consent.      Date: 6/3/24   Time: 10:00-11:00 am group  Number of Patients Present: 10  User Name: SRI Chavez, ATR  Number of Staff: 2      Topics: Introductions/feelings check-ins, Psychoeducation on personal strengths and weaknesses       Pts first set and shared weekly intentions. Next, pt. engaged in a 10-minute guided meditation and received psychoeducation on personal strengths. Pt. was present, attentive, and fully engaged in group discussion.      Facilitator: SRI Avendano, ATR  Secondary Facilitator: Janessa Pedro, [place]  Supervised by SRI Larry-S, ATR    Name: Jesus Castañeda YOB: 1990   MR: [id number]      He's been napping a lot lately, but wants to socialize more, getting out and seeing friends in person.

## 2024-06-03 NOTE — GROUP NOTE
Group Topic: Goals   Group Date: 6/3/2024  Start Time:  9:00 AM  End Time: 10:00 AM  Facilitators: SRI Carolina   Department: [facility]    Number of Participants: 10   Group Focus: check in and goals  Treatment Modality: Cognitive Behavioral Therapy  Interventions utilized were clarification, exploration, and support  Purpose: other: goal setting and accountability   Topic: SMART goal check in     Group members introduced themselves and checked in about their goals they set on Thursday ( SMART goal format). Time spent discussing goals, obstacles, and emotions. Group members could also share a high/low if goals were not set.    Facilitated by SRI Chavez   Secondary Facilitation: Janessa Pedro, [place]   Supervised by SRI Larry-S, ATR      Name: Jesus Castañeda YOB: 1990   MR: [id number]    Edgar shared he reached some goals but work got in the way of some goals. He shared he was not able to bake due to work obligations. He shared he rested instead and did discuss with his wife the goals.

## 2024-06-04 ENCOUNTER — APPOINTMENT (OUTPATIENT)
Dept: BEHAVIORAL HEALTH | Facility: CLINIC | Age: 34
End: 2024-06-04
Payer: COMMERCIAL

## 2024-06-05 ENCOUNTER — CLINICAL SUPPORT (OUTPATIENT)
Dept: BEHAVIORAL HEALTH | Facility: CLINIC | Age: 34
End: 2024-06-05
Payer: COMMERCIAL

## 2024-06-05 DIAGNOSIS — F33.1 MODERATE EPISODE OF RECURRENT MAJOR DEPRESSIVE DISORDER (MULTI): ICD-10-CM

## 2024-06-05 DIAGNOSIS — F41.9 ANXIETY: ICD-10-CM

## 2024-06-05 PROCEDURE — 90853 GROUP PSYCHOTHERAPY: CPT | Mod: 95 | Performed by: COUNSELOR

## 2024-06-05 NOTE — GROUP NOTE
Group Topic: Coping Skills   Group Date: 6/5/2024  Start Time:  9:00 AM  End Time: 10:00 AM  Facilitators: CHASIDY Murphy   Department: [facility]    Number of Participants: 10   Group Focus: anxiety, coping skills, depression, and diagnosis education  Treatment Modality: Psychoeducation and Other: trauma-informed  Interventions utilized were group exercise, patient education, and support  Purpose: coping skills and insight or knowledge    This was a video IOP group on a HIPAA compliant platform. All patients were provided with informed consent.     Date: 6/5/2024, Time: 9-10a, Number of Patients: 10, User Name: [email or web address],  Number of Staff: 2  Group topic(s): Check in, psychoeducation about the window of tolerance (Lenard Angel, PhD.) Group began with a feelings check in and brief mindfulness exercise. Group received psychoeducation about the impact of trauma on the nervous system and survival responses/adaptations to trauma (hyperarousal and hypoarousal.) Discussed the concept of the window of tolerance. Group members identified signs/symptoms that indicate they are approaching the limits of their window of tolerance. They also identified coping skills that move them out of either hypoarousal or hyperarousal.    MARELY Shaver, primary facilitator  HERI Dozier, co-facilitator  Supervised by Marleny Freedman [facility], ATR      Name: Jesus Castañeda YOB: 1990   MR: [id number]      Edgar shared feeling his window of tolerance has expanded as a result of treatment, both therapy and medication. He shared he is less quick to anger or dissociate in response to triggers. He shared that he rd with hyperarousal (rage) now by using square/diaphragmatic breathing and/or walking away from a situation.

## 2024-06-06 ENCOUNTER — CLINICAL SUPPORT (OUTPATIENT)
Dept: BEHAVIORAL HEALTH | Facility: CLINIC | Age: 34
End: 2024-06-06
Payer: COMMERCIAL

## 2024-06-06 DIAGNOSIS — F41.9 ANXIETY: ICD-10-CM

## 2024-06-06 DIAGNOSIS — F33.1 MODERATE EPISODE OF RECURRENT MAJOR DEPRESSIVE DISORDER (MULTI): ICD-10-CM

## 2024-06-06 PROCEDURE — 90853 GROUP PSYCHOTHERAPY: CPT | Mod: 95 | Performed by: COUNSELOR

## 2024-06-06 NOTE — GROUP NOTE
Group Topic: Goals   Group Date: 6/6/2024  Start Time:  9:00 AM  End Time: 10:00 AM  Facilitators: SRI Dudley   Department: [facility]    This was a video IOP group on a HIPAA compliant platform. All patients were provided with informed consent.     Date: 6/6/24  Time: 9:00-10:00 am group  Number of Patients Present: 9  User Name: SRI Chavez, ATR  Number of Staff: 1    Group Topic(s): Weekly topic review/Weekend SMART goal setting    Pts first engaged in a feelings check-in. Next, pts collectively made a list of group IOP topics that were covered throughout the week. Pts were then introduced to and completed the SMART goals worksheet which included two goals in the following domains: achievement, enjoyment, and closeness to others, along with anticipated obstacles and coping skills they plan to utilize. Lastly, pts shared with the group.  IOP facilitators will check-in about SMART goals on Monday, 6/10 for accountability purposes and support. Pt. was present, attentive, and fully engaged in group discussion.     Facilitator: SRI Avendano, ATR  Secondary Facilitator: HERI Ga  Supervised by SRI Larry, ATR    Name: Jesus Castañeda YOB: 1990   MR: [id number]      Pt. shared goals including: finishing house projects, spending time with a friend, going outside, working, and budgeting.   Pt. shared anticipated barriers/obstacles including: time/work  Pt. shared coping strategies including: walking away and deep breathing

## 2024-06-06 NOTE — GROUP NOTE
Group Topic: Coping Skills   Group Date: 6/6/2024  Start Time: 11:00 AM  End Time: 12:00 PM  Facilitators: SRI Carolina   Department: [facility]    Number of Participants: 9   Group Focus: goals and other preparing to make a change- stages of change   Treatment Modality: Cognitive Behavioral Therapy and Skills Training  Interventions utilized were assignment, patient education, and support  Purpose: coping skills, relapse prevention strategies, and trigger / craving management    Topic: Stages of Change and preparation    Group learned about the additional stages of change and selected a change they were ready to begin. Group wrote an affirmation associated with the change and identified one thing they can do daily to apply the change/habit. Group also reflected on what their future self would get to experience/enjoy. Final minutes spent delighting and saying farewell to discharging patients.     Facilitated by SRI Chavez   Secondary Facilitation: Meaghan Vega, [place]   Supervised by SRI Larry-S, ATR    Name: Jesus Castañeda YOB: 1990   MR: [id number]    Edgar shared spiraling quickly but does see/is able to see how it changed. His affirmation: I can make this change because I must make this change.

## 2024-06-06 NOTE — GROUP NOTE
"Group Topic: Feeling Awareness/Expression   Group Date: 6/6/2024  Start Time: 10:00 AM  End Time: 11:00 AM  Facilitators: SRI Carolina   Department: [facility]    Number of Participants: 9   Group Focus: goals and other stages of change   Treatment Modality: Other: motivational interviewing, DBT  Interventions utilized were assignment, exploration, and patient education  Purpose: coping skills, insight or knowledge, relapse prevention strategies, and trigger / craving management    Topic: Stages of Change + practice   This group learned about stages of change. Group began discussing how change can be hard and why. Then group learned about the first two steps of change. Group was guided to select an area of life they are considering change. They practiced a DBT pro/con analysis and then moved into small groups to share their process.     Facilitated by SRI Chavez   Secondary Facilitation: Meaghan LIRIANO   Supervised by SRI Larry-S, ATR    Name: Jesus Castañeda YOB: 1990   MR: [id number]    Edgar shared \"when you do something for so long and you try to make a change it can be so hard. \"He shared having an unhealthy habit that's enjoyable for him and yet pushes people away or hurt their lives.  Edgar shared how he has started to communicate differently with his wife and stages of change.     "

## 2024-06-07 ENCOUNTER — DOCUMENTATION (OUTPATIENT)
Dept: BEHAVIORAL HEALTH | Facility: CLINIC | Age: 34
End: 2024-06-07
Payer: COMMERCIAL

## 2024-06-07 NOTE — GROUP NOTE
Group Topic: Coping Skills   Group Date: 6/5/2024  Start Time: 11:00 AM  End Time: 12:00 PM  Facilitators: Maria C Moreno PsyD   Department: [facility]    Number of Participants: 7   Group Focus: clarity of thought  Treatment Modality: Cognitive Behavioral Therapy  Interventions utilized were group exercise  Purpose: maladaptive thinking - thought records, putting thoughts on trial, socratic questioning.    Date: 6/5/2024, Time: 11a-12p, Number of Patients: 7, Username: [email or web address], Number of Staff: 2    This session was conducted with HIPPA compliant video [employer] telehealth platform. Patient was provided with consent. Group focused on Thought Logs, Socratic Questions and Putting Thoughts on Trial. Provided psychoeducation on how to work through unhelpful thinking styles through these tools. The Group took break out time to practice, and discussed examples on ways they could use this in day to day life.     Primary Facilitator - Maria C Moreno PsyD  Secondary Facilitator - Janessa Pedro, [place]  Supervisor Marleny Freedman, [facility]-S, ATR    Name: Jesus Castañeda YOB: 1990   MR: [id number]      Edgar appeared attentive but did not actively participate.

## 2024-06-07 NOTE — GROUP NOTE
Group Topic: Coping Skills   Group Date: 6/5/2024  Start Time: 10:00 AM  End Time: 11:00 AM  Facilitators: Maria C Moreno PsyD   Department: [facility]    Number of Participants: 10   Group Focus: coping skills  Treatment Modality: Cognitive Behavioral Therapy  Interventions utilized were patient education  Purpose: maladaptive thinking - thought records, socratic questioning, putting thoughts on trial    Date: 6/5/2024, Time: 10a-11a, Number of Patients: 10, Username: [email or web address], Number of Staff: 2    This session was conducted with HIPPA compliant video Zoom telehealth platform. Patient was provided with consent. Group focused on unhealthy thinking styles and tools to challenge. Provided psychoeducation on 10 cognitive distortions. Group members provided examples on ways they saw unhelpful thinking happening in their lives. We discussed how thoughts and feelings impact behaviors. Discussed thought logs as a tool to work on re-structuring and re-framing unhelpful thoughts.    Primary Facilitator - Maria C Moreno PsyD  Secondary Facilitator - Janessa Pedro, [place]  Supervisor Marleny Freedman, [facility]-S, ATR    Name: Jesus Castañeda YOB: 1990   MR: [id number]      Edgar shared that he experiences catastrophizing cognitive distortion a lot.

## 2024-06-10 ENCOUNTER — CLINICAL SUPPORT (OUTPATIENT)
Dept: BEHAVIORAL HEALTH | Facility: CLINIC | Age: 34
End: 2024-06-10
Payer: COMMERCIAL

## 2024-06-10 ENCOUNTER — TELEMEDICINE (OUTPATIENT)
Dept: BEHAVIORAL HEALTH | Facility: CLINIC | Age: 34
End: 2024-06-10
Payer: COMMERCIAL

## 2024-06-10 DIAGNOSIS — F33.1 MODERATE EPISODE OF RECURRENT MAJOR DEPRESSIVE DISORDER (MULTI): ICD-10-CM

## 2024-06-10 DIAGNOSIS — F41.9 ANXIETY: ICD-10-CM

## 2024-06-10 DIAGNOSIS — F33.9 EPISODE OF RECURRENT MAJOR DEPRESSIVE DISORDER, UNSPECIFIED DEPRESSION EPISODE SEVERITY (CMS-HCC): ICD-10-CM

## 2024-06-10 PROCEDURE — 99214 OFFICE O/P EST MOD 30 MIN: CPT | Performed by: CLINICAL NURSE SPECIALIST

## 2024-06-10 PROCEDURE — 90853 GROUP PSYCHOTHERAPY: CPT | Mod: U2,95

## 2024-06-10 RX ORDER — TRAZODONE HYDROCHLORIDE 50 MG/1
50 TABLET ORAL NIGHTLY PRN
Qty: 30 TABLET | Refills: 1 | Status: SHIPPED | OUTPATIENT
Start: 2024-06-10 | End: 2024-07-15 | Stop reason: SDUPTHER

## 2024-06-10 RX ORDER — ARIPIPRAZOLE 5 MG/1
5 TABLET ORAL DAILY
Qty: 30 TABLET | Refills: 1 | Status: SHIPPED | OUTPATIENT
Start: 2024-06-10 | End: 2024-07-15 | Stop reason: SDUPTHER

## 2024-06-10 NOTE — GROUP NOTE
Group Topic: Feeling Awareness/Expression   Group Date: 6/10/2024  Start Time: 10:00 AM  End Time: 11:00 AM  Facilitators: SRI Dudley   Department: [facility]    This was a video IOP group on a HIPAA compliant platform. All patients were provided with informed consent.     Date: 6/10/2024  Time: 10:00 am-11:00 am group  Number of Patients Present: 8  Number of Staff: 2  User Name: SRI Chavez ATR    Group Topic(s): Feelings check-in/Meditation/Psychoeducation on recovery     Pt. first set and shared weekly intentions. Next, pt. engaged in a 10-minute guided meditation. Lastly, pt. received psychoeducation on mental health recovery. Pt. was present, attentive, and fully engaged in group discussion.    Facilitator: SRI Avendano, ATR  Co-facilitator: Janessa Pedro, [place]   Supervised by SRI Larry-S, ATR    Name: Jesus Castañeda YOB: 1990   MR: [id number]      Intention for the week is to find something positive to do in his time off.
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
## 2024-06-11 ENCOUNTER — CLINICAL SUPPORT (OUTPATIENT)
Dept: BEHAVIORAL HEALTH | Facility: CLINIC | Age: 34
End: 2024-06-11
Payer: COMMERCIAL

## 2024-06-11 DIAGNOSIS — F41.1 GENERALIZED ANXIETY DISORDER: ICD-10-CM

## 2024-06-11 DIAGNOSIS — F33.1 MODERATE EPISODE OF RECURRENT MAJOR DEPRESSIVE DISORDER (MULTI): ICD-10-CM

## 2024-06-11 PROCEDURE — 90853 GROUP PSYCHOTHERAPY: CPT | Mod: U2,95

## 2024-06-11 NOTE — GROUP NOTE
"Group Topic: Community   Group Date: 6/11/2024  Start Time:  9:00 AM  End Time: 10:00 AM  Facilitators: SRI Carolina   Department: [facility]    Number of Participants: 10   Group Focus: communication, healthy friendships, and other expectations and relationships  Treatment Modality: Patient-Centered Therapy  Interventions utilized were exploration, patient education, and support  Purpose: insight or knowledge and self-care    Topic: Relationships, The expectation of others     Group came back to a topic from yesterday's group about the expectations of others. Group discussed the feelings associated with not feeling like they're doing enough or feeling like they \"should\" be able to do more. Group had time to discuss personal shares surrounding the topic. Time spent learning about being intentional in sharing with others and communicating effectively.     Facilitated by SRI Chavez   Secondary Facilitation: HERI Ga   Supervised by SRI Larry-S, ATR      Name: Jesus Castañeda YOB: 1990   MR: [id number]    Edgar shared feeling like he shuts down quickly when he and others have high expectations and someone important in his life “shoots it down.” How can I do more than I'm already doing? Patient reports shutting down in these instances. He also discussed I statements vs. we statements.     "

## 2024-06-12 ENCOUNTER — CLINICAL SUPPORT (OUTPATIENT)
Dept: BEHAVIORAL HEALTH | Facility: CLINIC | Age: 34
End: 2024-06-12
Payer: COMMERCIAL

## 2024-06-12 DIAGNOSIS — F33.1 MODERATE EPISODE OF RECURRENT MAJOR DEPRESSIVE DISORDER (MULTI): ICD-10-CM

## 2024-06-12 DIAGNOSIS — F41.1 GENERALIZED ANXIETY DISORDER: ICD-10-CM

## 2024-06-12 PROCEDURE — 90853 GROUP PSYCHOTHERAPY: CPT | Mod: 95 | Performed by: COUNSELOR

## 2024-06-12 NOTE — GROUP NOTE
Group Topic: Feeling Awareness/Expression   Group Date: 6/12/2024  Start Time:  9:00 AM  End Time: 10:00 AM  Facilitators: SRI Carolina   Department: [facility]    Number of Participants: 7   Group Focus: anger management and feeling awareness/expression  Treatment Modality: Cognitive Behavioral Therapy, Dialectical Behavioral Therapy, and Psychoeducation  Interventions utilized were assignment, exploration, and patient education  Purpose: feelings and insight or knowledge    Topic: identifying emotion + anger   Group discussed the purpose of emotion and was provided education/handouts on emotions reason per DBT literature. Time spent discussing their upbringing and comfort level of emotion identification and expression. Group members guided to explore an emotion they would like to understand better and asked to identify sensations, behaviors, and thoughts associated. Time spent sharing. Questions welcome and feedback.     Facilitated by SRI Chavez   Secondary Facilitation: HERI Dozier   Supervised by SRI Larry-S, ATR    Name: Jeuss Castañeda YOB: 1990   MR: [id number]     Edgar contributed to discussion. He explored the feeling of happy and shared: When he's happy, he's active, participating, in things, communicative, enjoys seeing people. On the other hand, happiness can be taken too far. When he's very happy, he can be very reckless, not slowing down and thinking about the things he does. Once he goes too far, he spirals and isolates, feels threatened which comes out as anger, blames himself for losing that happiness, fears consequences.

## 2024-06-13 ENCOUNTER — APPOINTMENT (OUTPATIENT)
Dept: BEHAVIORAL HEALTH | Facility: CLINIC | Age: 34
End: 2024-06-13
Payer: COMMERCIAL

## 2024-06-13 NOTE — GROUP NOTE
Group Topic: Personal Responsibility   Group Date: 6/11/2024  Start Time: 10:00 AM  End Time: 11:00 AM  Facilitators: SRI Rosado; SRI Carolina   Department: [facility]    This was a video IOP group on a HIPAA compliant platform. All patients were provided with informed consent.     Date: 6/11/2024, Time: 10-11a, Number of Patients: 8, User Name: [email or web address],  Number of Staff: 2  Group topic(s): 4 horseman & love languages. The group engaged in exploring the concept of unhelpful/toxic communication in our relationships and identified the 4 horseman including contempt, criticism, defensiveness and stonewalling. As a whole, patients identified each of these communication traits and began exploring antidotes to criticism and contempt. Group discussion followed.     Name: Jesus Castañeda YOB: 1990   MR: [id number]      Edgar was on topic and present. Patient engaged appropriately and shared his own experiences with unhelpful communication including defensiveness.   SRI Thompson    Secondary facilitator: Meaghan Vega, CT  CT supervised by SRI Larry-S, ATR

## 2024-06-13 NOTE — GROUP NOTE
Group Topic: Personal Responsibility   Group Date: 6/11/2024  Start Time: 11:00 AM  End Time: 12:00 PM  Facilitators: SRI Rosado; SRI Carolina   Department: [facility]    This was a video IOP group on a HIPAA compliant platform. All patients were provided with informed consent.     Date: 6/11/2024, Time: 11a-12p, Number of Patients: 8, User Name: [email or web address],  Number of Staff: 2  Group topic(s): 4 horseman & love languages. The group continued exploring the 4 horseman antidotes for defensiveness and stonewalling. As a whole, the 5 love languages were explored including physical touch, quality time, gift giving/receiving, words of affirmations, and acts of service. Individually, the group identified the ways in which they feel they give and receive love and shared specific intention to meet someone's love language this week.     Name: Jesus Castañeda YOB: 1990   MR: [id number]      Edgar was present and attentive. Patient was engaged and followed along appropriately. Patient shared struggling to communicate with his wife and that their love languages are very different. Patient endorsed planning to buy his wife flowers to try and meet her love language.   SRI Thompson    Secondary facilitator: Meaghan Vega, CT  CT supervised by SRI Larry-S, ATR

## 2024-06-14 ENCOUNTER — DOCUMENTATION (OUTPATIENT)
Dept: BEHAVIORAL HEALTH | Facility: CLINIC | Age: 34
End: 2024-06-14
Payer: COMMERCIAL

## 2024-06-14 NOTE — PROGRESS NOTES
[place]   INTENSIVE OUTPATIENT PROGRAM MULTIDISCIPLINARY  TREATMENT PLAN & PROGRESS NOTE     Patient: Jesus Castañeda      : 1990  Age:      Student: No  Treatment Team Date: 24       MRN# [id number]   Nurse Practitioner: Malissa Loyola NP  Date of IOP Admission: 24    Ref by: Self              Week  5  Admission Scores: MACHELLE 21: 17 RICARDO: 11 BDI: 18  PHQ-9: 11 MAST: 0 DAST: 0 MDQ: Positive      Current Risk Assessment:  Suicidal Risk:      None:    Ideation:  x     Plan:      Intent:      SI Plan with plan contracts for safety:     Hx of harming self:        Homicidal Risk:  None:  X Ideation:       Plan:      Intent:      HI Plan with means:                                     Hx of harming others:          Global Improvement    Clinical Global Impressions Rating Scale Of Illness:  5  1-No Illness Present   2-Minimal   3-Mild   4-Moderate   5-Marked  x 6-Severe  7-Very Severe     Diagnoses & ICD-10 Codes  Primary Diagnosis & Code: Bipolar Disorder, Current episode depressed; F31.30   Secondary Diagnosis & Code: Generalized Anxiety Disorder; F41.1     Psychosocial & Environmental Stressors:   Financial  insecurity/stress   Medication  Family/Home life Difficulties   Work Stress    Inadequacy of social support   Hx of trauma   Recent psychiatric inpatient discharge      Patient's Treatment Goals:            Date Initiated:            Progress Update:  2024  1.  Attend and actively participate in IOP _4_ days/week for 3 hours per day   2024  Good  X   Fair    Poor     Unclear   2.  Attend weekly medication management sessions and maintain compliance of medications  2024      Good  X  Fair     Poor     Unclear                                                        2.  Identify symptoms of diagnoses and develop coping plans    2024   Good  X  Fair     Poor     Unclear                                                 3.  Increase illness education and symptom insight                   5/6/2024  Good  X  Fair     Poor     Unclear        Current medications:  See EMR chart        Progress note:  __New to the IOP program, attending as planned  _X_Compliant, Progressing & Improving - Needs more time in IOP therapy program   __Compliant, Progressing & Improving Planning Discharge   __Compliant, Not Progressing or Improving: Reason  __Non-Compliant, not progressing or improving: Plan  __Other:     Insurance & Benefits: [facility] TRADITIONAL PLAN, IN NETWORK, BENEFITS ARE BASED ON MEDICAL NECESSITY ONLY: Unlimited visits, covers 85 % of the contracted rate after deductible has been met. No deductible for this plan. Once out of pocket maximum is met, plan pays 100%.    Co-Pay per day:     DEDUCTIBLE        Single:  / Family: / Accumulation:   Single:  /  Family: /   CO- INSURANCE  Single:  / Family:  / Accumulation:  Single:  /  Family: /    OUT OF POCKET  Single: 1,600.00 Family: 3,200.00 Accumulation: Single: 1,353.09 Family: 1,734.67         Total IOP Sessions completed & authorized to date:  17    Discharge plans have been discussed with patient & NP Malissa Loyola on:     Reason for discharge:    ___Successfully completed IOP treatment:   ___Pt. decided to seek treatment elsewhere:   ___Dropped out or no show more than three times:  ___Benefits exhausted:   ___Other:    Discharge date:                     Discharge Prognosis: Excellent      Good     Fair     Poor    Follow up appointment with: Physician:   Follow up appointment with: Therapist:    Follow up Aftercare group?  Yes   Patient provided with Crisis Hotline phone number for suicide prevention? Yes     Discharge Scores: Not Completed       Treatment plan electronically signed by Treatment Team:   SRI Pickett, CROW MARTIN NP, SFalguni Moreno, Psy ALICIA Shelton LPCC

## 2024-06-14 NOTE — GROUP NOTE
Group Topic: Feeling Awareness/Expression   Group Date: 6/12/2024  Start Time: 11:00 AM  End Time: 12:00 PM  Facilitators: Maria C Moreno PsyD   Department: [facility]    Number of Participants: 7   Group Focus: anxiety  Treatment Modality: Cognitive Behavioral Therapy  Interventions utilized were group exercise  Purpose: other: Anxiety and Avoidance Hierarchy    Date: 6/12/2024, Time: 11a-12p, Number of Patients: 7, Username: [email or web address], Number of Staff: 2    This session was conducted via HIPAA compliant video [employer] telehealth platform. Patient was provided with informed consent. Group focused on developing avoidance hierarchy and SUDS scale. We tied this into discussion on cycle of anxiety and panic. Discussed exposure tracking log.    Primary Facilitator: Maria C Moreno PsyD  Secondary Facilitator - Janessa Pedro, [place]  Supervisor Marleny Freedman, [facility], ATR        Name: Jesus Castañeda YOB: 1990   MR: [id number]      Edgar appeared attentive but did not actively participate.

## 2024-06-14 NOTE — GROUP NOTE
Group Topic: Feeling Awareness/Expression   Group Date: 6/12/2024  Start Time: 10:00 AM  End Time: 11:00 AM  Facilitators: Maria C Moreno PsyD   Department: [facility]    Number of Participants: 7   Group Focus: anxiety  Treatment Modality: Cognitive Behavioral Therapy  Interventions utilized were patient education  Purpose: other: Cycle of Anxiety and Avoidance Hierarchy    Date: 6/12/2024, Time: 10a-11a, Number of Patients: 7, Username: [email or web address], Number of Staff: 2    This session was conducted via HIPAA compliant video Zoom telehealth platform. Patient was provided with informed consent. Group focused on cycle of anxiety and panic. Provided psychoeducation on anxiety, avoidance, escape, and safety behaviors. Group members had a discussion about their own safety behaviors and how that plays into their presentation of anxiety. Group discussed ways to engage in small exposures to challenge anxiety and coping skills.     Primary Facilitator: Maria C Moreno PsyD  Secondary Facilitator - Janessa Pedro, [place]  Supervisor Marleny Freedman, [facility], ATR    Name: Jesus Castañeda YOB: 1990   MR: [id number]      Edgar reflected on how anxiety impacts his life.

## 2024-06-17 ENCOUNTER — APPOINTMENT (OUTPATIENT)
Dept: BEHAVIORAL HEALTH | Facility: CLINIC | Age: 34
End: 2024-06-17
Payer: COMMERCIAL

## 2024-06-17 ENCOUNTER — CLINICAL SUPPORT (OUTPATIENT)
Dept: BEHAVIORAL HEALTH | Facility: CLINIC | Age: 34
End: 2024-06-17
Payer: COMMERCIAL

## 2024-06-17 DIAGNOSIS — F41.9 ANXIETY: ICD-10-CM

## 2024-06-17 DIAGNOSIS — F33.1 MODERATE EPISODE OF RECURRENT MAJOR DEPRESSIVE DISORDER (MULTI): ICD-10-CM

## 2024-06-17 PROCEDURE — 90853 GROUP PSYCHOTHERAPY: CPT | Mod: U2,95

## 2024-06-17 NOTE — GROUP NOTE
Group Topic: Feeling Awareness/Expression   Group Date: 6/17/2024  Start Time: 10:00 AM  End Time: 11:00 AM  Facilitators: SRI Dudley   Department: [facility]    This was a video IOP group on a HIPAA compliant platform. All patients were provided with informed consent.     Date: 6/17/24, Time: 10:00-11:00 am group, Number of Patients Present: 10, User Name: SRI Chavez, ATR, Number of Staff: 2    Group Topic(s): Mindfulness meditation, Psychoeducation on self-esteem and self-compassion    Pt. first set and shared an intention for the week. Pt. then engaged in a 10-minute mindfulness meditation. Lastly, pt. received psychoeducation on self-esteem and self-compassion.     Facilitator: SRI Avendano, ATR  Secondary Facilitator: Janessa Pedro [place]  Supervised by SRI Larry-S, ATR    Name: Jesus Castañeda YOB: 1990   MR: [id number]      Edgar set an intention to reach out and spend time with his best friend this week.

## 2024-06-17 NOTE — GROUP NOTE
Group Topic: Feeling Awareness/Expression   Group Date: 6/17/2024  Start Time: 11:00 AM  End Time: 12:00 PM  Facilitators: SRI Dudley   Department: [facility]    This was a video IOP group on a HIPAA compliant platform. All patients were provided with informed consent.     Date: 6/17/2024, Time: 11:00 am-12:00 pm group, Number of Patients Present: 10, User Name: SRI Chavez, ATR, Number of Staff: 2    Group Topic(s): Self-esteem and self-compassion through the use of art-based media      Pt. was introduced to a self-esteem and self-compassion art therapy intervention. Pt. then traced their hand on a blank piece of paper and filled it with loving images, symbols, and words.   Pt. then independently answered the following questions:  1. What images and positive thoughts did you place in your hand?  2. How does thinking positively affect your mood and attitude?  3. What things can you do/incorporate in your life to think more positively?  4. Why do you deserve to feel cheerful, admired, and loved?  5. What are some positive things others would say about you?  6. Where do you plan to keep this worksheet + art to remind yourself to practice self- compassion?     Pts will share and process tomorrow during the 9 am IOP group due to limited time.     Facilitator: SRI Avendano, ATR  Secondary Facilitator: Janessa Pedro, [place]  Supervised by SRI Larry-S, ATR    Name: Jesus Castañeda YOB: 1990   MR: [id number]

## 2024-06-18 ENCOUNTER — CLINICAL SUPPORT (OUTPATIENT)
Dept: BEHAVIORAL HEALTH | Facility: CLINIC | Age: 34
End: 2024-06-18
Payer: COMMERCIAL

## 2024-06-18 ENCOUNTER — APPOINTMENT (OUTPATIENT)
Dept: BEHAVIORAL HEALTH | Facility: CLINIC | Age: 34
End: 2024-06-18
Payer: COMMERCIAL

## 2024-06-18 ENCOUNTER — TELEMEDICINE (OUTPATIENT)
Dept: BEHAVIORAL HEALTH | Facility: CLINIC | Age: 34
End: 2024-06-18
Payer: COMMERCIAL

## 2024-06-18 DIAGNOSIS — F33.1 MODERATE EPISODE OF RECURRENT MAJOR DEPRESSIVE DISORDER (MULTI): ICD-10-CM

## 2024-06-18 DIAGNOSIS — F63.2 KLEPTOMANIA IN ADULT: ICD-10-CM

## 2024-06-18 DIAGNOSIS — F41.1 GENERALIZED ANXIETY DISORDER: ICD-10-CM

## 2024-06-18 DIAGNOSIS — F31.81 BIPOLAR 2 DISORDER (MULTI): ICD-10-CM

## 2024-06-18 PROBLEM — H20.9 UVEITIS: Status: ACTIVE | Noted: 2017-10-17

## 2024-06-18 PROBLEM — I83.12 VARICOSE VEINS OF LEFT LOWER EXTREMITY WITH INFLAMMATION: Status: ACTIVE | Noted: 2024-06-18

## 2024-06-18 PROBLEM — F17.210 NICOTINE DEPENDENCE, CIGARETTES, UNCOMPLICATED: Status: ACTIVE | Noted: 2024-06-18

## 2024-06-18 PROCEDURE — 99214 OFFICE O/P EST MOD 30 MIN: CPT | Performed by: CLINICAL NURSE SPECIALIST

## 2024-06-18 PROCEDURE — 90853 GROUP PSYCHOTHERAPY: CPT | Mod: U2,95

## 2024-06-18 NOTE — PROGRESS NOTES
Outpatient Psychiatry      Subjective   Jesus Castañeda, is a 34 y.o. male seen in follow up today.       Assessment/Plan   Diagnoses:   Encounter Diagnoses   Name Primary?    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 Reviewed safety plan today. Denies current suicidal thinking. Plans to extend engagement in IOP for at least another week.     Tolerated titration of aripiprazole without side effects. Likely too soon to assess benefit, and difficult given recent stressors.     Making progress toward IOP treatment goals. Treatment plan reviewed with IOP team weekly.     Provided with crisis/emergency resources, including Mobile Crisis [phone], Crisis Text Line (text [id number] nw [id number]) and the National Suicide Prevention Lifeline hotline 1-648.880.4391. Agrees to call 988 or go to the nearest emergency department if he feels unsafe, or has suicidal thinking with a plan or intent.     Treatment Plan/Recommendations:   Continue aripiprazole 5 mg PO daily.   Continue escitalopram 10 mg PO daily.  Continue buspirone 5 mg PO BID.  Continue trazodone 50 mg PO at bedtime PRN for sleep.   Continue engagement in IOP for up to 4 days/week or 12 hours/week.   Follow-up in 1 week for medication management.       Reason for Visit:  IOP follow-up for medication management.     HPI:  Since his last visit,     Wife told him she wants to separate.   Wanted him to sign a letter giving permission to destroy embryos   Shared that she didn't think he had the capacity to do the things she wanted (e.g. planning for the future)    Thursday night took an overdose of 5-6 tabs from an old opioid prescription.   Impulsively, didn't think it would be a lethal dose. Didn't tell anyone immediately or seek medical attention.   Hasn't had any further suicidal thoughts.   Seeking  "support from close friends and family.  Planning to extend time in IOP for an additional week for additional support.     Tolerated increase in aripiprazole.   Hard to say whether there has been benefit yet, given significant stressors he's gone through.    Denies suicidal ideation or a passive death wish.     No new medications or health problems.       Current Psychiatric Medications:  Lexapro 10 mg PO daily   Buspirone 10 mg PO BID  Abilify 5 mg PO daily  Trazodone 50 mg PO at bedtime PRN for sleep      Record Review: brief     Medical Review Of Systems:  Pertinent items are noted in HPI.    Psychiatric Review Of Systems:  As noted in HPI.        Objective   Mental Status Exam   General Appearance: Well groomed, appropriate eye contact  Attitude/Behavior: Cooperative  Motor: No psychomotor agitation or retardation, no tremor or other abnormal movements  Speech: Normal rate, volume, prosody  Gait/Station: Other:(comment) (not observed- virtual .)  Mood: \"Not great, but I'm doing ok.\"  Affect: dysphoric, Congruent with mood and topic of conversation  Thought Process: Linear, goal directed  Thought Associations: No loosening of associations  Thought Content: Other: (comment) (depressive themes r/t dynamics with wife.)  Perception: No perceptual abnormalities noted  Sensorium: Alert and oriented to person, place, time and situation  Insight: Intact  Judgement: Intact  Cognition: Cognitively intact to conversational testing with respect to attention, orientation, fund of knowledge, recent and remote memory, and language         Vitals:  There were no vitals filed for this visit.        Malissa Loyola, APRN-CNP, APRN-CNS   "

## 2024-06-18 NOTE — GROUP NOTE
Group Topic: Feeling Awareness/Expression   Group Date: 6/18/2024  Start Time:  9:00 AM  End Time: 10:00 AM  Facilitators: SRI Dudley   Department: [facility]    This was a video IOP group on a HIPAA compliant platform. All patients were provided with informed consent.     Date: 6/18/2024  Time: 9:00-10:00 am group  Number of Patients Present: 10  User Name: SRI Chavez, ATR  Number of Staff: 1    Group Topic(s): Feeling check-ins, Self-esteem and self-compassion continued      Pt. first engaged in a feelings check-in. Yesterday, pt. completed a self-esteem and self-compassion art therapy intervention, where they traced both of their hands on a blank piece of paper and filled them with ways in which they can take care of themselves. Today, pt. shared and processed with the group. Pts collectively discussed how thinking positively impacts their mood and attitude, ways of practicing self-compassion, positive things other people say about them, and reasons they deserve to be cheerful, admired, and loved.     Name: Jesus Castañeda YOB: 1990   MR: [id number]      Edgar shared feeling excluded. Edgar declined to share about his self-compassion activity, but offered support and feedback to other group members throughout session.     SRI Avendano, ATR

## 2024-06-18 NOTE — GROUP NOTE
"Group Topic: Goals   Group Date: 6/17/2024  Start Time:  9:00 AM  End Time: 10:00 AM  Facilitators: SRI Carolina   Department: [facility]    Number of Participants: 9   Group Focus: check in and goals  Treatment Modality: Cognitive Behavioral Therapy  Interventions utilized were exploration and support  Purpose: coping skills and other: accountability and SMART goals     Topic: Check in + Weeklend Goal Check     Group checked in and introduced themselves to new group member. Then group spent time checking in on their weekend in relation to the goals set on Thursday. Time spent processing what worked, coping skills, and ways to improve achievement/goal achievement.     Facilitated by SRI Chavez   Secondary Facilitation: Janessa Pedro, [place]   Supervised by SRI Larry-S, ATR      Name: Jesus Castañeda YOB: 1990   MR: [id number]    Edgar shared a high from the weekend of playing golf. Patient reported \"really low lows.\" Patient offered a check in with secondary. Time spent in break out room.   "

## 2024-06-19 ENCOUNTER — CLINICAL SUPPORT (OUTPATIENT)
Dept: BEHAVIORAL HEALTH | Facility: CLINIC | Age: 34
End: 2024-06-19
Payer: COMMERCIAL

## 2024-06-19 DIAGNOSIS — F41.9 ANXIETY: ICD-10-CM

## 2024-06-19 DIAGNOSIS — F33.1 MODERATE EPISODE OF RECURRENT MAJOR DEPRESSIVE DISORDER (MULTI): ICD-10-CM

## 2024-06-19 PROCEDURE — 90853 GROUP PSYCHOTHERAPY: CPT | Mod: U2,95

## 2024-06-19 NOTE — GROUP NOTE
Group Topic: Personal Responsibility   Group Date: 6/18/2024  Start Time: 10:00 AM  End Time: 11:00 AM  Facilitators: SRI Rosado; SRI Dudley   Department: [facility]    This was a video IOP group on a HIPAA compliant program. All patients were provided with informed consent.     Date: 6/18/2024, Time: 10-11a, Number of Patients: 10, Username: [email or web address], Number of Staff: 2  Group Topic(s): symptom identification. The group explored the definition of symptoms and how symptoms can present themselves physically, mentally, and emotionally. The group worked through brainstorming mild, moderate, and severe depressive symptoms they experienced. Anxiety and manic symptoms will be discussed in later group.      Name: Jesus Castañeda YOB: 1990   MR: [id number]      Edgar was on topic and present. Patient shared during group conversation and endorsed examples of depressive symptoms including feeling defensive and lower productivity.   SRI Thompson    Secondary facilitator: Louisa Ramírez

## 2024-06-19 NOTE — GROUP NOTE
Group Topic: Feeling Awareness/Expression   Group Date: 6/19/2024  Start Time:  9:00 AM  End Time: 10:00 AM  Facilitators: SRI Carolina   Department: [facility]    Number of Participants: 8   Group Focus: clarity of thought, feeling awareness/expression, and other inner conflict awareness  Treatment Modality: Psychoeducation and Other: influenced by Internal Family Systems   Interventions utilized were assignment, exploration, and patient education  Purpose: feelings and other: inner conflict     Topic: Introduction to parts work; inner conflict and IFS    Group was asked to write down a situation where a part of them wants/needs to do something and another part wants/needs something else. Group shared examples/personal stories. Then group learned about the model of IFS. Group was guided through the managers, firefighters, exiles, and self through definition and active note taking/following along. More time will be provided to engage in additional understanding/application of content at later date.     Facilitated by SRI Chavez   Secondary Facilitation: Janessa Pedro, [place]   Supervised by SRI Larry-S, ATR      Name: Jesus Castañeda YOB: 1990   MR: [id number]    Patient inner conflict occurs between desire to open up to his wife as she tells him to overshare and he believes it could be helpful versus the belief that he won't be believed or that his shares won't be well-received. He was following along with content.

## 2024-06-19 NOTE — GROUP NOTE
Group Topic: Personal Responsibility   Group Date: 6/18/2024  Start Time: 11:00 AM  End Time: 12:00 PM  Facilitators: SRI Rosado; SRI Dudley   Department: [facility]    This was a video IOP group on a HIPAA compliant program. All patients were provided with informed consent.     Date: 6/18/2024, Time: 11a-12p, Number of Patients: 10, Username: [email or web address], Number of Staff: 2  Group Topic(s): symptom identification. The group explored the definition of symptoms and how symptoms can present themselves physically, mentally, and emotionally. The group worked through brainstorming mild, moderate, and severe symptoms of anxiety and vivek.      Name: Jesus Castañeda YOB: 1990   MR: [id number]      Edgar was on topic and present. Patient followed along and was attentive throughout exercise. Patient shared examples of vivek including increased energy, feeling he is spiraling, inability to sit still and hopelessness after engaging in unhelpful behaviors.   SRI Thompson    Secondary facilitator: Louisa Ramírez

## 2024-06-19 NOTE — PROGRESS NOTES
[place]   INTENSIVE OUTPATIENT PROGRAM MULTIDISCIPLINARY  TREATMENT PLAN & PROGRESS NOTE     Patient: Jesus Castañeda      : 1990  Age:      Student: No  Treatment Team Date: 24       MRN# [id number]   Nurse Practitioner: Malissa Loyola NP  Date of IOP Admission: 24    Ref by: Self              Week 6  Admission Scores: MACHELLE 21: 17 RICARDO: 11 BDI: 18  PHQ-9: 11 MAST: 0 DAST: 0 MDQ: Positive      Current Risk Assessment:  Suicidal Risk:      None:    Ideation:  x     Plan:      Intent:      SI Plan with plan contracts for safety:     Hx of harming self:        Homicidal Risk:  None:  X Ideation:       Plan:      Intent:      HI Plan with means:                                     Hx of harming others:          Global Improvement    Clinical Global Impressions Rating Scale Of Illness:  5  1-No Illness Present   2-Minimal   3-Mild   4-Moderate   5-Marked  x 6-Severe  7-Very Severe     Diagnoses & ICD-10 Codes  Primary Diagnosis & Code: Bipolar Disorder, Current episode depressed; F31.30   Secondary Diagnosis & Code: Generalized Anxiety Disorder; F41.1     Psychosocial & Environmental Stressors:   Financial  insecurity/stress   Medication  Family/Home life Difficulties   Work Stress    Inadequacy of social support   Hx of trauma   Recent psychiatric inpatient discharge      Patient's Treatment Goals:            Date Initiated:            Progress Update:  2024  1.  Attend and actively participate in IOP _4_ days/week for 3 hours per day   2024  Good  X   Fair    Poor     Unclear   2.  Attend weekly medication management sessions and maintain compliance of medications  2024      Good  X  Fair     Poor     Unclear                                                        2.  Identify symptoms of diagnoses and develop coping plans    2024   Good  X  Fair     Poor     Unclear                                                 3.  Increase illness education and symptom insight                   5/6/2024  Good  X  Fair     Poor     Unclear        Current medications:  See EMR chart        Progress note:  __New to the IOP program, attending as planned  _X_Compliant, Progressing & Improving - Needs more time in IOP therapy program   __Compliant, Progressing & Improving Planning Discharge   __Compliant, Not Progressing or Improving: Reason  __Non-Compliant, not progressing or improving: Plan  __Other:     Insurance & Benefits: [facility] TRADITIONAL PLAN, IN NETWORK, BENEFITS ARE BASED ON MEDICAL NECESSITY ONLY: Unlimited visits, covers 85 % of the contracted rate after deductible has been met. No deductible for this plan. Once out of pocket maximum is met, plan pays 100%.    Co-Pay per day:     DEDUCTIBLE        Single:  / Family: / Accumulation:   Single:  /  Family: /   CO- INSURANCE  Single:  / Family:  / Accumulation:  Single:  /  Family: /    OUT OF POCKET  Single: 1,600.00 Family: 3,200.00 Accumulation: Single: 1,353.09 Family: 1,734.67         Total IOP Sessions completed & authorized to date:  20    Discharge plans have been discussed with patient & NP Malissa Loyola on:     Reason for discharge:    ___Successfully completed IOP treatment:   ___Pt. decided to seek treatment elsewhere:   ___Dropped out or no show more than three times:  ___Benefits exhausted:   ___Other:    Discharge date:                     Discharge Prognosis: Excellent      Good     Fair     Poor    Follow up appointment with: Physician:   Follow up appointment with: Therapist:    Follow up Aftercare group?  Yes   Patient provided with Crisis Hotline phone number for suicide prevention? Yes     Discharge Scores: Not Completed       Treatment plan electronically signed by Treatment Team:   SRI Pickett, CROW MARTIN NP, SFalguni Moreno, Psy ALICIA Shelton LPCC

## 2024-06-20 ENCOUNTER — CLINICAL SUPPORT (OUTPATIENT)
Dept: BEHAVIORAL HEALTH | Facility: CLINIC | Age: 34
End: 2024-06-20
Payer: COMMERCIAL

## 2024-06-20 DIAGNOSIS — F41.1 GENERALIZED ANXIETY DISORDER: ICD-10-CM

## 2024-06-20 DIAGNOSIS — F33.1 MODERATE EPISODE OF RECURRENT MAJOR DEPRESSIVE DISORDER (MULTI): ICD-10-CM

## 2024-06-20 PROCEDURE — 90853 GROUP PSYCHOTHERAPY: CPT | Mod: 95 | Performed by: COUNSELOR

## 2024-06-20 NOTE — GROUP NOTE
Group Topic: Feeling Awareness/Expression   Group Date: 6/20/2024  Start Time: 10:00 AM  End Time: 11:00 AM  Facilitators: SRI Carolina   Department: [facility]    Number of Participants: 10   Group Focus: feeling awareness/expression and other journaling about a part ( IFS)   Treatment Modality: Other: Internal Family Systems influenced  Interventions utilized were assignment, exploration, and other journal/question answer   Purpose: feelings    Topic: Getting to know a part Internal Family Systems     This group was a continuation of yesterdays group. Group reviewed basic components of internal family systems and then focused on one part; members answered questions to better understand the function of the part/emotion. Time spent answering questions.     Facilitated by Selina Gusman [facility]     Name: Jesus Castañeda YOB: 1990   MR: [id number]    Edgar was on and off camera. He did not share his writing but had been focusing on wanting to have conversation with his wife and also fearing the outcome due to past/hx.

## 2024-06-20 NOTE — GROUP NOTE
Group Topic: Goals   Group Date: 2024  Start Time:  9:00 AM  End Time: 10:00 AM  Facilitators: SRI Dudley   Department: [facility]    This was a video IOP group on a HIPAA compliant platform. All patients were provided with informed consent.     Date: 24  Time: 9:00-10:00 am group  Number of Patients Present: 9   User Name: SRI Chavez, ATR  Number of Staff: 1    Group Topic(s): Weekly topic review/Weekend SMART goal setting    Pts first engaged in a feelings check-in. Next, pts collectively made a list of IOP topics that were covered throughout the week. Pts were then introduced to and completed the SMART goals worksheet which included two goals in the following domains: achievement, enjoyment, and closeness to others, along with anticipated obstacles and coping skills they plan to utilize. Lastly, pts shared with the group.  IOP facilitators will check-in about SMART goals on Monday,  for accountability purposes and support. Pt. was present, attentive, and fully engaged in group discussion.     Facilitator: SRI Avendano, ATR    Name: Jesus Castañeda YOB: 1990   MR: [id number]      Pt. shared feeling tired and hungry.   Pt. shared goals including: doing house work and completing work paperwork.   Pt. shared anticipated barriers/obstacles including: anxiety, low motivation  Pt. shared coping strategies includin second rule and listening to music

## 2024-06-20 NOTE — GROUP NOTE
Group Topic: Personal Responsibility   Group Date: 6/20/2024  Start Time: 11:00 AM  End Time: 12:00 PM  Facilitators: SRI Carolina   Department: [facility]    Number of Participants: 10   Group Focus: other values identification   Treatment Modality: Cognitive Behavioral Therapy  Interventions utilized were assignment, exploration, and support  Purpose: insight or knowledge and self-worth    Topic: values   Group defined values. They selected a category to focus on ( community, work/career, family, mental/emotional health etc). Then group spent time identifying values important to them in the category selected. Time spent reflecting on those values; answering questions about how to live in those values, and recalling time they may have lived in those values.     Facilitated by Selina Gusman [facility]    Name: Jesus Castañeda YOB: 1990   MR: [id number]    Edgar - trust, compassion, forgiveness, belonging in intimate relationship. He said his wedding night was a time that he lived in these values.

## 2024-06-21 ENCOUNTER — DOCUMENTATION (OUTPATIENT)
Dept: BEHAVIORAL HEALTH | Facility: CLINIC | Age: 34
End: 2024-06-21
Payer: COMMERCIAL

## 2024-06-21 NOTE — GROUP NOTE
Group Topic: Community   Group Date: 6/19/2024  Start Time: 11:00 AM  End Time: 12:00 PM  Facilitators: Maria C Moreno PsyD   Department: [facility]    Number of Participants: 9   Group Focus: other Boundaries  Treatment Modality: Cognitive Behavioral Therapy  Interventions utilized were group exercise  Purpose: other: Boundaries in relationships.    Date: 6/19/2024, Time: 11a-12p, Number of Patients: 9, Username: [email or web address], Number of Staff: 2    This session was conducted with HIPPA compliant video Zoom telehealth platform. Patient was provided with consent. Group focused on boundaries. Provided psychoeducation on 5 ways to set healthy boundaries. The Group discussed ways to verbalize boundaries and set consequences. The group came together and identified a relationship, setting or environment to work on setting healthy boundaries.    Primary Facilitator - Maria C Moreno PsyD  Secondary Facilitator - Janessa ePdro, [place]  Supervisor Marleny Freedman, [facility]-S, ATR    Name: Jesus Castañeda YOB: 1990   MR: [id number]      Edgar provided support and feedback to others based on boundaries.

## 2024-06-21 NOTE — GROUP NOTE
Group Topic: Community   Group Date: 6/19/2024  Start Time: 10:00 AM  End Time: 11:00 AM  Facilitators: Maria C Moreno PsyD   Department: [facility]    Number of Participants: 9   Group Focus: other boundaries  Treatment Modality: Cognitive Behavioral Therapy  Interventions utilized were patient education  Purpose: Boundaries in Relationships    Date: 6/19/2024, Time: 10a-11a, Number of Patients: 9, Username: [email or web address], Number of Staff: 2    This session was conducted with HIPPA compliant video Zoom telehealth platform. Patient was provided with consent. Group focused on boundaries. Provided psychoeducation on Rigid, Porous, and Healthy Boundaries. The Group discussed ten types of boundaries. Group members provided examples on ways they saw boundary crossing happening in their lives. We discussed how boundaries impact interpersonal relationships.     Primary Facilitator - Maria C Moreno PsyD  Secondary Facilitator - Janessa Pedro, [place]  Supervisor Marleny Freedman, [facility]-S, ATR    Name: Jesus Castañeda YOB: 1990   MR: [id number]      Edgar shared that his biggest boundary with wife is that he will not engage in a discussion when alcohol or anger are involved, but then he gets labeled as the one who walks away, not communicative, and defensive. Makes it hard to maintain boundaries. Feels “shitty” at first to set and enforce boundaries, but it feels better in the long run.

## 2024-06-24 ENCOUNTER — DOCUMENTATION (OUTPATIENT)
Dept: BEHAVIORAL HEALTH | Facility: CLINIC | Age: 34
End: 2024-06-24

## 2024-06-24 ENCOUNTER — CLINICAL SUPPORT (OUTPATIENT)
Dept: BEHAVIORAL HEALTH | Facility: CLINIC | Age: 34
End: 2024-06-24
Payer: COMMERCIAL

## 2024-06-24 ENCOUNTER — TELEMEDICINE (OUTPATIENT)
Dept: BEHAVIORAL HEALTH | Facility: CLINIC | Age: 34
End: 2024-06-24
Payer: COMMERCIAL

## 2024-06-24 DIAGNOSIS — F33.9 EPISODE OF RECURRENT MAJOR DEPRESSIVE DISORDER, UNSPECIFIED DEPRESSION EPISODE SEVERITY (CMS-HCC): ICD-10-CM

## 2024-06-24 DIAGNOSIS — F63.2 KLEPTOMANIA IN ADULT: ICD-10-CM

## 2024-06-24 DIAGNOSIS — F33.1 MODERATE EPISODE OF RECURRENT MAJOR DEPRESSIVE DISORDER (MULTI): ICD-10-CM

## 2024-06-24 DIAGNOSIS — F41.9 ANXIETY: ICD-10-CM

## 2024-06-24 DIAGNOSIS — F31.81 BIPOLAR 2 DISORDER (MULTI): ICD-10-CM

## 2024-06-24 PROCEDURE — 90853 GROUP PSYCHOTHERAPY: CPT | Mod: U2,95

## 2024-06-24 PROCEDURE — 99213 OFFICE O/P EST LOW 20 MIN: CPT | Performed by: CLINICAL NURSE SPECIALIST

## 2024-06-24 NOTE — PROGRESS NOTES
"Outpatient Psychiatry      Subjective   Jesus Castañeda, is a 34 y.o. male seen in follow up today.     Assessment/Plan   Diagnoses:   Encounter Diagnoses   Name Primary?    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 Significant psychosocial stressors have been challenging to manage, and he has been appropriately accessing supports. Does not feel it would be feasible to remain in IOP longer, given financial strain and need to return to full time work. Encouraged to participate in aftercare, and to schedule sooner follow-up visits with individual therapist and outpatient med provider.       Treatment Plan/Recommendations:   Continue aripiprazole 5 mg PO daily.   Continue buspirone 5 mg PO BID.  Continue Lexapro 10 mg PO daily  Continue trazodone 50 mg PO at bedtime PRN for sleep.   Will complete IOP program this week- transition to aftercare, individual therapy, resume outpatient med management.   Aware that he can contact me with any questions/concerns re: medications prior to next outpatient visit.       Reason for Visit:  IOP follow-up for medication management.     HPI:  Since his last visit,     Things have been stressful.   Wife was notified that she will be laid off from her job.   He'll be going back to work tomorrow.   Tried to reassure her that he'll provide any support he can.   Feels like it's an opportunity to     Trying to keep as much of a positive outlook as he can.   Has been anxious around lack of communication with wife.   Talked to some family members and friends.     Sleep has been, \"hit or miss.\"   Past few days was poor, prior to that was normal.   No changes in energy level or appetite/weight.     Denies suicidal ideation or a passive death wish.     No new medications or health problems.       Current Psychiatric " "Medications:  Lexapro 10 mg PO daily   Buspirone 5 mg PO BID  Abilify 5 mg PO daily  Trazodone 50 mg PO at bedtime PRN for sleep      Record Review: brief     Medical Review Of Systems:  Pertinent items are noted in HPI.    Psychiatric Review Of Systems:  As noted in HPI.        Objective   Mental Status Exam   General Appearance: Well groomed, appropriate eye contact  Attitude/Behavior: Cooperative  Motor: No psychomotor agitation or retardation, no tremor or other abnormal movements  Speech: Normal rate, volume, prosody  Gait/Station: Other:(comment) (not observed- virtual)  Mood: \"A little stressed. Up and down.\"  Affect: Congruent with mood and topic of conversation  Thought Process: Linear, goal directed  Thought Associations: No loosening of associations  Thought Content: Other: (comment) (ongoing anxious and depressive themes r/t uncertainty about marriage, financial stressors given wife's recent job loss.)  Perception: No perceptual abnormalities noted  Sensorium: Alert and oriented to person, place, time and situation  Insight: Intact  Judgement: Intact  Cognition: Cognitively intact to conversational testing with respect to attention, orientation, fund of knowledge, recent and remote memory, and language      Vitals:  There were no vitals filed for this visit.        Malissa Loyola, APRN-CNP, APRN-CNS   "

## 2024-06-25 ENCOUNTER — APPOINTMENT (OUTPATIENT)
Dept: BEHAVIORAL HEALTH | Facility: CLINIC | Age: 34
End: 2024-06-25
Payer: COMMERCIAL

## 2024-06-25 ENCOUNTER — DOCUMENTATION (OUTPATIENT)
Dept: BEHAVIORAL HEALTH | Facility: CLINIC | Age: 34
End: 2024-06-25
Payer: COMMERCIAL

## 2024-06-25 NOTE — GROUP NOTE
Group Topic: Goals   Group Date: 6/24/2024  Start Time:  9:00 AM  End Time: 10:00 AM  Facilitators: SRI Carolina   Department: [facility]    Number of Participants: 10   Group Focus: check in, community group, and goals  Treatment Modality: Cognitive Behavioral Therapy  Interventions utilized were assignment and support  Purpose: insight or knowledge and other: accountability + goals     Group topic(s): Weekend check-ins     On Thursday, pts. were presented with and completed the SMART goals worksheet, which included goals in the following categories: achievement, closeness to others, and enjoyment, along with anticipated obstacles and coping strategies they can utilize. Today, pts. checked in, shared about their weekends, and set intentions for the week.    Facilitated by SRI Chavez   Secondary Facilitation: Janessa Pedro CT   Supervised by SRI Larry-S, ATR    Name: Jesus Castañeda YOB: 1990   MR: [id number]    Edgar checked in that he had a really rough weekend again. He was provided validation for arriving to group. He did share he accomplished doing chores over the weekend.

## 2024-06-25 NOTE — PROGRESS NOTES
I met with patient to discuss overall mood, progress in IOP, upcoming discharge and FMLA documentation. Patient reported a decrease in symptoms and feeling ready to discharge this week, which I validated. I informed patient his work documentation would be completed and returned by end of day on 6/25/24. Patient plans to attend IOP aftercare beginning the week of 7/1/24. Patient has follow-up appointments made with his therapist and medication provider. Patient denied SI/HI.

## 2024-06-25 NOTE — PROGRESS NOTES
Patient informed [facility] staff that he will need to discharge from the program early due to family/home life difficulties. Patient is still eligible to engage in [facility] aftercare services starting the week of 7/1/2024. Patient has appointments set up with his outpatient therapist and medication provider. Patient denied SI/HI.

## 2024-06-25 NOTE — PROGRESS NOTES
[place]   INTENSIVE OUTPATIENT PROGRAM MULTIDISCIPLINARY  TREATMENT PLAN & PROGRESS NOTE     Patient: Jesus Castañeda      : 1990  Age:      Student: No  Treatment Team Date: 24       MRN# [id number]   Nurse Practitioner: Malissa Loyola NP  Date of IOP Admission: 24    Ref by: Self              Week 7  Admission Scores: MACHELLE 21: 17 RICARDO: 11 BDI: 18  PHQ-9: 11 MAST: 0 DAST: 0 MDQ: Positive      Current Risk Assessment:  Suicidal Risk:      None:    Ideation:  x     Plan:      Intent:      SI Plan with plan contracts for safety:     Hx of harming self:        Homicidal Risk:  None:  X Ideation:       Plan:      Intent:      HI Plan with means:                                     Hx of harming others:          Global Improvement    Clinical Global Impressions Rating Scale Of Illness: 4  1-No Illness Present   2-Minimal   3-Mild   4-Moderate X  5-Marked   6-Severe  7-Very Severe     Diagnoses & ICD-10 Codes  Primary Diagnosis & Code: Bipolar Disorder, Current episode depressed; F31.30   Secondary Diagnosis & Code: Generalized Anxiety Disorder; F41.1     Psychosocial & Environmental Stressors:   Financial  insecurity/stress   Medication  Family/Home life Difficulties   Work Stress    Inadequacy of social support   Hx of trauma   Recent psychiatric inpatient discharge      Patient's Treatment Goals:            Date Initiated:            Progress Update:  2024  1.  Attend and actively participate in IOP _4_ days/week for 3 hours per day   2024  Good  X   Fair    Poor     Unclear   2.  Attend weekly medication management sessions and maintain compliance of medications  2024      Good  X  Fair     Poor     Unclear                                                        2.  Identify symptoms of diagnoses and develop coping plans    2024   Good  X  Fair     Poor     Unclear                                                 3.  Increase illness education and symptom insight                   5/6/2024  Good  X  Fair     Poor     Unclear        Current medications:  See EMR chart        Progress note:  __New to the IOP program, attending as planned  _X_Compliant, Progressing & Improving - Needs more time in IOP therapy program   __Compliant, Progressing & Improving Planning Discharge   __Compliant, Not Progressing or Improving: Reason  __Non-Compliant, not progressing or improving: Plan  __Other:     Insurance & Benefits: [facility] TRADITIONAL PLAN, IN NETWORK, BENEFITS ARE BASED ON MEDICAL NECESSITY ONLY: Unlimited visits, covers 85 % of the contracted rate after deductible has been met. No deductible for this plan. Once out of pocket maximum is met, plan pays 100%.    Co-Pay per day:     DEDUCTIBLE        Single:  / Family: / Accumulation:   Single:  /  Family: /   CO- INSURANCE  Single:  / Family:  / Accumulation:  Single:  /  Family: /    OUT OF POCKET  Single: 1,600.00 Family: 3,200.00 Accumulation: Single: 1,353.09 Family: 1,734.67         Total IOP Sessions completed & authorized to date:  24    Discharge plans have been discussed with patient & NP Malissa Loyola on:     Reason for discharge:    ___Successfully completed IOP treatment:   ___Pt. decided to seek treatment elsewhere:   ___Dropped out or no show more than three times:  ___Benefits exhausted:   ___Other:    Discharge date:                     Discharge Prognosis: Excellent      Good     Fair     Poor    Follow up appointment with: Physician:   Follow up appointment with: Therapist:    Follow up Aftercare group?  Yes   Patient provided with Crisis Hotline phone number for suicide prevention? Yes     Discharge Scores: Not Completed       Treatment plan electronically signed by Treatment Team:   SRI Pickett, CROW MARTIN NP, SFalguni Moreno, Psy ALICIA Shelton LPCC

## 2024-06-26 ENCOUNTER — APPOINTMENT (OUTPATIENT)
Dept: BEHAVIORAL HEALTH | Facility: CLINIC | Age: 34
End: 2024-06-26
Payer: COMMERCIAL

## 2024-06-26 RX ORDER — BUSPIRONE HYDROCHLORIDE 5 MG/1
5 TABLET ORAL 2 TIMES DAILY
Qty: 60 TABLET | Refills: 2 | Status: SHIPPED | OUTPATIENT
Start: 2024-06-26 | End: 2024-09-24

## 2024-06-27 ENCOUNTER — APPOINTMENT (OUTPATIENT)
Dept: BEHAVIORAL HEALTH | Facility: CLINIC | Age: 34
End: 2024-06-27
Payer: COMMERCIAL

## 2024-06-28 ENCOUNTER — DOCUMENTATION (OUTPATIENT)
Dept: BEHAVIORAL HEALTH | Facility: CLINIC | Age: 34
End: 2024-06-28

## 2024-06-28 ENCOUNTER — APPOINTMENT (OUTPATIENT)
Dept: BEHAVIORAL HEALTH | Facility: CLINIC | Age: 34
End: 2024-06-28
Payer: COMMERCIAL

## 2024-06-28 DIAGNOSIS — F33.1 MODERATE EPISODE OF RECURRENT MAJOR DEPRESSIVE DISORDER (MULTI): ICD-10-CM

## 2024-06-28 DIAGNOSIS — F41.1 GENERALIZED ANXIETY DISORDER: ICD-10-CM

## 2024-06-28 PROCEDURE — 90791 PSYCH DIAGNOSTIC EVALUATION: CPT

## 2024-06-28 NOTE — PROGRESS NOTES
[place]   INTENSIVE OUTPATIENT PROGRAM MULTIDISCIPLINARY  TREATMENT PLAN & PROGRESS NOTE     Patient: Jesus Catsañeda      : 1990  Age:      Student: No  Treatment Team Date: 24       MRN# [id number]   Nurse Practitioner: Malissa Loyola NP  Date of IOP Admission: 24    Ref by: Self              Week 8   Admission Scores: MACHELLE 21: 17 RICARDO: 11 BDI: 18  PHQ-9: 11 MAST: 0 DAST: 0 MDQ: Positive      Current Risk Assessment:  Suicidal Risk:      None:    Ideation:  x     Plan:      Intent:      SI Plan with plan contracts for safety:     Hx of harming self:        Homicidal Risk:  None:  X Ideation:       Plan:      Intent:      HI Plan with means:                                     Hx of harming others:          Global Improvement    Clinical Global Impressions Rating Scale Of Illness: 4  1-No Illness Present   2-Minimal   3-Mild   4-Moderate X  5-Marked   6-Severe  7-Very Severe     Diagnoses & ICD-10 Codes  Primary Diagnosis & Code: Bipolar Disorder, Current episode depressed; F31.30   Secondary Diagnosis & Code: Generalized Anxiety Disorder; F41.1     Psychosocial & Environmental Stressors:   Financial  insecurity/stress   Medication  Family/Home life Difficulties   Work Stress    Inadequacy of social support   Hx of trauma   Recent psychiatric inpatient discharge      Patient's Treatment Goals:            Date Initiated:            Progress Update:  2024  1.  Attend and actively participate in IOP _4_ days/week for 3 hours per day   2024  Good  X   Fair    Poor     Unclear   2.  Attend weekly medication management sessions and maintain compliance of medications  2024      Good  X  Fair     Poor     Unclear                                                        2.  Identify symptoms of diagnoses and develop coping plans    2024   Good  X  Fair     Poor     Unclear                                                 3.  Increase illness education and symptom insight                   5/6/2024  Good  X  Fair     Poor     Unclear        Current medications:  See EMR chart        Progress note:  __New to the IOP program, attending as planned  _X_Compliant, Progressing & Improving - Needs more time in IOP therapy program   __Compliant, Progressing & Improving Planning Discharge   __Compliant, Not Progressing or Improving: Reason  __Non-Compliant, not progressing or improving: Plan  __Other:     Insurance & Benefits: [facility] TRADITIONAL PLAN, IN NETWORK, BENEFITS ARE BASED ON MEDICAL NECESSITY ONLY: Unlimited visits, covers 85 % of the contracted rate after deductible has been met. No deductible for this plan. Once out of pocket maximum is met, plan pays 100%.    Co-Pay per day:     DEDUCTIBLE        Single:  / Family: / Accumulation:   Single:  /  Family: /   CO- INSURANCE  Single:  / Family:  / Accumulation:  Single:  /  Family: /    OUT OF POCKET  Single: 1,600.00 Family: 3,200.00 Accumulation: Single: 1,353.09 Family: 1,734.67         Total IOP Sessions completed & authorized to date:  26    Discharge plans have been discussed with patient & NP Malissa Loyola on: 6/24/24      Reason for discharge:    X__Successfully completed IOP treatment:   _Pt. decided to seek treatment elsewhere:   ___Dropped out or no show more than three times:  ___Benefits exhausted:   ___Other:    Discharge date:      6/25/24               Discharge Prognosis: Good    Follow up appointment with: Physician: Yes  Follow up appointment with: Therapist:  Yes  Follow up Aftercare group?  Yes   Patient provided with Crisis Hotline phone number for suicide prevention? Yes     Discharge Scores: Not Completed       Treatment plan electronically signed by Treatment Team:   SRI Pickett, CROW MARTIN NP, SFalguni Moreno, Psy ALICIA Shelton LPCC

## 2024-07-01 ENCOUNTER — APPOINTMENT (OUTPATIENT)
Dept: BEHAVIORAL HEALTH | Facility: CLINIC | Age: 34
End: 2024-07-01
Payer: COMMERCIAL

## 2024-07-01 DIAGNOSIS — F33.1 MODERATE EPISODE OF RECURRENT MAJOR DEPRESSIVE DISORDER (MULTI): ICD-10-CM

## 2024-07-01 DIAGNOSIS — F41.1 GENERALIZED ANXIETY DISORDER: ICD-10-CM

## 2024-07-01 PROCEDURE — 90837 PSYTX W PT 60 MINUTES: CPT

## 2024-07-01 NOTE — PROGRESS NOTES
Therapy Session  Progress Note    Session Type: Virtual    Start Time: 11:00 am  End Time: 11:55 am    Appointment: Scheduled    Reason for Visit: Depression, Anxiety    Patient Symptoms/Interval History: Has tried to communicate his feelings with his wife of wanting to continue the marriage. Gets very anxious and panicky when she doesn't respond. Finds this confusing. Has a history of over-spending, lack of communication and lying to his wife which has created trust issues. Was not there as much as his wife wanted during the IVF process. Realizes she felt alone during that time. Lied to his wife in the past since he thought she would be disappointed in him.    Mental Status: Alert & Oriented x3    Functional Status: No impairment    Interventions Provided: CBT, Communication Training - discussed making assumptions and mind-reading    Progress Made: Minimum    Response to Interventions: The patient was receptive to the interventions provided.    Action Plan/Homework: The patient would like to get clarification from his wife if she wants to work on the marriage. He would also like to communicate to her how it makes him anxious and panicky when she doesn't respond to him.    Treatment Plan: Use CBT to help the patient gain awareness of unhelpful thought patterns and core beliefs. Help the patient learn healthy coping skills to manage anxiety.    Follow Up/Next Appointment: Follow up in 1 weeks.      LAURO Garnica, AKINS    **This visit was performed using real-time telehealth tools, including an audio/video connection between the patient and this provider. The patient provided consent for the individual telemedicine service and understands the limitations of the visit.   08-Feb-2017
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
## 2024-07-01 NOTE — PSYCHOTHERAPY
Went to a birthday party of a co-worker  Spoke with wife yesterday, told her how he felt  Told her that he loves her, don't want a divorce  Hard when he doesn't get a response    Thinks she doesn't care, he was in a funk, misses companionship, gets upset  He went upstairs, was upset  Its confusing when there's no response    She would get mad when he didn't talk, hard to be open and vulnerable when she acts like she doesn't care    Her actions don't show that she does  She used to be more open, she's done    Small actions - so its confusing    Her feedback - lack of communication, he wasn't fully participating fully in IOP  She took it that he wasn't committed  He would spiral and text her things  It raises his anxiety high when she doesn't respond and he spirals  Any response would be helpful  Panic-inducing    Important things aren't talk about over text - her boundary    Hx in relationship - he was over-spending, not communicating, if she did or said something he would ignore it  Would cause arguments, he got depressed  He lied about things  Very impulsive, addictive personality  Able to think more clearly -    Learning that people want to help  Trust issues - says she can't trust him  He tries to isolate  Going to do what he says    Lied - disappointment, she would be  His whole life - spending, too much, lying is on and off    Able to have more control over spending    Not clear - on whether to work on marriage  Thursday - said she wanted a divorce    Did research on   Co-dependency going on  Confused as to what it is, dependent on her    Had good times before  Bought a house, started IVF, caused a lot of stress on her end    She said he's not playing his role in it, he   He didn't know how to show emotion    She felt alone in this, her mom went with her  He went for appointments  She says he wants more    Its him doing things without being told    Gets reprimanded for asking - makes him feel worthless,  stupid  Questioning my intelligence, like maggy  Has to defend himself    Most helpful - to find out if something she wants to work on or not  Tell how he feels - to share feelings re no response

## 2024-07-02 ENCOUNTER — APPOINTMENT (OUTPATIENT)
Dept: BEHAVIORAL HEALTH | Facility: CLINIC | Age: 34
End: 2024-07-02
Payer: COMMERCIAL

## 2024-07-03 ENCOUNTER — APPOINTMENT (OUTPATIENT)
Dept: BEHAVIORAL HEALTH | Facility: CLINIC | Age: 34
End: 2024-07-03
Payer: COMMERCIAL

## 2024-07-08 ENCOUNTER — APPOINTMENT (OUTPATIENT)
Dept: BEHAVIORAL HEALTH | Facility: CLINIC | Age: 34
End: 2024-07-08
Payer: COMMERCIAL

## 2024-07-08 DIAGNOSIS — F33.1 MODERATE EPISODE OF RECURRENT MAJOR DEPRESSIVE DISORDER (MULTI): ICD-10-CM

## 2024-07-08 DIAGNOSIS — F41.1 GENERALIZED ANXIETY DISORDER: ICD-10-CM

## 2024-07-08 PROCEDURE — 90837 PSYTX W PT 60 MINUTES: CPT

## 2024-07-08 NOTE — PSYCHOTHERAPY
"Talked to wife - her response is that's a boundary of her he doesn't respect  She's rarely home, she won't have a conversation    Argument - said she hasn't given me a chance  Feels like she's done  He's trying to get his shit together    He set a boundary - wouldn't be communicating  Says he's not trying, not telling people everything  He doesn't have a reason to hide or lie    She said he would be on his own financially  She says \"past 2 years\"    He was very impulsive, spending, stealing, lying, lied about paying bills, broke her trust  Change in behavior, the worst, often    Stressors for past 2 years - bought a house, IVF, him overspending  Feels more in control now, more level-headed    Feels hypomania at times - hyper, anxious, resourceful, helping at work, can't sit still, restless, overthink, overwhelmed, unable to sleep,   She said don't leave    He would look restless, get up and down  Distressed signs, overthinking    Using box breathing  5-4-3-2-1 exercise    Thinks she wants a divorce  Makes him want to leave    Thinks its out of his control, can't control what other people think  Accepting   Grieves already, when he thinks about it    Used to go on walks, do puzzles, inseparable, best friend    Her sister has an influence, saying \"screw Edgar\"    Has a friend he can stay with  She wants him to be successful    HW: make him feel good about himself, hair cut, cooking again                                                                                                          "

## 2024-07-08 NOTE — PROGRESS NOTES
Therapy Session  Progress Note    Session Type: Virtual    Start Time: 1:00 pm  End Time: 1:55 pm    Appointment: Scheduled    Reason for Visit: Depression, Anxiety    Patient Symptoms/Interval History: Has felt a high level of anxiety, difficulty sleeping, unable to sit still. Having difficulties in his marriage, unsure if his wife wants a divorce. Realizes he can't control other people and is trying to accept the situation. Feels like he is grieving the relationship as it used to be. Willing to focus on his own self-care. Has a plan to stay with a friend if his wife wants to divorce.    Mental Status: Alert & Oriented x3    Functional Status: No impairment    Interventions Provided: CBT, Develop Coping Skills- 5-4-3-2-1 Grounding Exercise    Progress Made: Moderate    Response to Interventions: The patient was receptive to the interventions provided.    Action Plan/Homework: The patient would like to get his haircut and to cook some healthy meals as a way to focus on his own self-care.    Treatment Plan: Use CBT to help the patient gain awareness of unhelpful thought patterns and core beliefs. Help the patient learn healthy coping skills to manage depression and anxiety.    Follow Up/Next Appointment: Follow up in 1 weeks.      LAURO Garnica, CHASIDY-S    **This visit was performed using real-time telehealth tools, including an audio/video connection between the patient and this provider. The patient provided consent for the individual telemedicine service and understands the limitations of the visit.

## 2024-07-15 ENCOUNTER — APPOINTMENT (OUTPATIENT)
Dept: BEHAVIORAL HEALTH | Facility: CLINIC | Age: 34
End: 2024-07-15
Payer: COMMERCIAL

## 2024-07-15 DIAGNOSIS — F33.9 EPISODE OF RECURRENT MAJOR DEPRESSIVE DISORDER, UNSPECIFIED DEPRESSION EPISODE SEVERITY (CMS-HCC): ICD-10-CM

## 2024-07-15 DIAGNOSIS — F41.9 ANXIETY: ICD-10-CM

## 2024-07-15 DIAGNOSIS — F33.1 MODERATE EPISODE OF RECURRENT MAJOR DEPRESSIVE DISORDER (MULTI): ICD-10-CM

## 2024-07-15 DIAGNOSIS — F41.1 GENERALIZED ANXIETY DISORDER: ICD-10-CM

## 2024-07-15 PROBLEM — Z31.41 FERTILITY TESTING: Status: ACTIVE | Noted: 2024-07-15

## 2024-07-15 PROBLEM — G43.909 MIGRAINE: Status: ACTIVE | Noted: 2020-12-28

## 2024-07-15 PROBLEM — H52.7 REFRACTION ERROR: Status: ACTIVE | Noted: 2024-07-15

## 2024-07-15 PROBLEM — R22.1 NECK MASS: Status: ACTIVE | Noted: 2024-07-15

## 2024-07-15 PROBLEM — K11.8 SUBMANDIBULAR GLAND MASS: Status: ACTIVE | Noted: 2024-07-15

## 2024-07-15 PROBLEM — K62.5 RECTAL BLEEDING: Status: ACTIVE | Noted: 2024-07-15

## 2024-07-15 PROBLEM — I83.819 PAIN DUE TO VARICOSE VEINS OF LOWER EXTREMITY: Status: ACTIVE | Noted: 2024-07-15

## 2024-07-15 PROBLEM — R06.02 SHORTNESS OF BREATH: Status: ACTIVE | Noted: 2020-12-28

## 2024-07-15 PROBLEM — M19.90 ARTHRITIS: Status: ACTIVE | Noted: 2024-07-15

## 2024-07-15 PROBLEM — R10.9 ABDOMINAL PAIN: Status: ACTIVE | Noted: 2024-07-15

## 2024-07-15 PROBLEM — R76.8 POSITIVE ANA (ANTINUCLEAR ANTIBODY): Status: ACTIVE | Noted: 2024-07-15

## 2024-07-15 PROBLEM — K92.2 GASTROINTESTINAL HEMORRHAGE: Status: ACTIVE | Noted: 2024-07-15

## 2024-07-15 PROBLEM — H52.7 DISORDER OF REFRACTION: Status: ACTIVE | Noted: 2024-07-15

## 2024-07-15 PROBLEM — K92.2 GI BLEED: Status: ACTIVE | Noted: 2024-07-15

## 2024-07-15 PROBLEM — K64.9 HEMORRHOIDS: Status: ACTIVE | Noted: 2024-07-15

## 2024-07-15 PROBLEM — M54.50 ACUTE LOW BACK PAIN: Status: ACTIVE | Noted: 2024-07-15

## 2024-07-15 PROBLEM — R76.8 POSITIVE ANTINUCLEAR ANTIBODY: Status: ACTIVE | Noted: 2020-12-28

## 2024-07-15 PROBLEM — K11.8: Status: ACTIVE | Noted: 2024-07-15

## 2024-07-15 PROBLEM — K60.2 ANAL FISSURE: Status: ACTIVE | Noted: 2024-07-15

## 2024-07-15 PROBLEM — R05.9 COUGH: Status: ACTIVE | Noted: 2020-12-28

## 2024-07-15 PROBLEM — M79.10 MYALGIA, UNSPECIFIED SITE: Status: ACTIVE | Noted: 2020-12-28

## 2024-07-15 PROBLEM — R03.0 ELEVATED BP WITHOUT DIAGNOSIS OF HYPERTENSION: Status: ACTIVE | Noted: 2024-07-15

## 2024-07-15 PROBLEM — B30.9 ACUTE VIRAL CONJUNCTIVITIS OF BOTH EYES: Status: ACTIVE | Noted: 2024-07-15

## 2024-07-15 PROCEDURE — 90837 PSYTX W PT 60 MINUTES: CPT

## 2024-07-15 RX ORDER — ARIPIPRAZOLE 5 MG/1
5 TABLET ORAL DAILY
Qty: 30 TABLET | Refills: 1 | Status: SHIPPED | OUTPATIENT
Start: 2024-07-15 | End: 2024-09-13

## 2024-07-15 RX ORDER — TRAZODONE HYDROCHLORIDE 50 MG/1
50 TABLET ORAL NIGHTLY PRN
Qty: 30 TABLET | Refills: 1 | Status: SHIPPED | OUTPATIENT
Start: 2024-07-15 | End: 2024-09-13

## 2024-07-15 RX ORDER — ESCITALOPRAM OXALATE 10 MG/1
10 TABLET ORAL DAILY
Qty: 30 TABLET | Refills: 2 | Status: SHIPPED | OUTPATIENT
Start: 2024-07-15

## 2024-07-15 NOTE — PROGRESS NOTES
Therapy Session  Progress Note    Session Type: Virtual    Start Time: 11:00 am  End Time: 11:55 am    Appointment: Scheduled    Reason for Visit: Depression, Anxiety    Patient Symptoms/Interval History: Feels confused about the status of his marriage, not communicating with his wife. Trying to set healthy boundaries for himself and to remain more positive. Reported that things he does around the house used to be appreciated but now are minimized by his wife. Feels like the relationship is in limbo, trying to take care of himself. Would like to work things out with his wife but is making a plan to stay with a friend if things don't work out.    Mental Status: Alert & Oriented x3    Functional Status: No impairment    Interventions Provided: CBT, Communication Training    Progress Made: Moderate    Response to Interventions: The patient was receptive to the interventions provided.    Action Plan/Homework: The patient was open to writing a letter to his wife to express his thoughts, feelings and hopes for their relationship.    Treatment Plan: Use CBT to help the patient gain awareness of unhelpful thought patterns and core beliefs. Help the patient learn healthy communication skills.    Follow Up/Next Appointment: Follow up in 2 weeks.      LAURO Garnica, CHASIDY-S    **This visit was performed using real-time telehealth tools, including an audio/video connection between the patient and this provider. The patient provided consent for the individual telemedicine service and understands the limitations of the visit.

## 2024-07-15 NOTE — PSYCHOTHERAPY
"Nothing new w/ wife, confusing, \"she's not going through w/ it\"  What's taking you so long?    She fills her time elsewhere, went to MIL's  Wife lost her job, looking for a new one    Medications - gets upset at times, work stress, got over it  Telling himself to move on, don't worry about other people    Setting boundaries    Wife upset - \"I need to fix it\" - talk to her, cooked - made him anxious    It went unnoticed -   Cooking - taken for granted    Why aren't you doing more?\", things were miminized  Stress events took on, IVF, she was doing all the effort, became resentful, blames him for being a male    Never really talked about it, her job was more flexible  It was a goal and a plan    Think she feels disappointed it didn't work, think she tries to hide from it    He was disappointed but still hopeful  She's very family-oriented. A goal of both of theirs    She's mad, a light switch, she seems unapproachable  He printed dissolution paperwork    In limbo, try to keep her happy  Being protective of himself    Has a lot of things he would like to talk about  He's done things he shouldn't have done  She worries about the unknown    She's scared of his actions    Step away if conversation not going well                                                                                                                "

## 2024-07-29 ENCOUNTER — APPOINTMENT (OUTPATIENT)
Dept: BEHAVIORAL HEALTH | Facility: CLINIC | Age: 34
End: 2024-07-29
Payer: COMMERCIAL

## 2024-08-13 ENCOUNTER — APPOINTMENT (OUTPATIENT)
Dept: BEHAVIORAL HEALTH | Facility: CLINIC | Age: 34
End: 2024-08-13
Payer: COMMERCIAL

## 2024-10-15 DIAGNOSIS — F63.2 KLEPTOMANIA IN ADULT: ICD-10-CM

## 2024-10-15 DIAGNOSIS — I83.12 VARICOSE VEINS OF LEFT LOWER EXTREMITY WITH INFLAMMATION: ICD-10-CM

## 2024-10-24 ENCOUNTER — HOSPITAL ENCOUNTER (OUTPATIENT)
Dept: VASCULAR MEDICINE | Facility: HOSPITAL | Age: 34
Discharge: HOME | End: 2024-10-24
Payer: COMMERCIAL

## 2024-10-24 DIAGNOSIS — I83.12 VARICOSE VEINS OF LEFT LOWER EXTREMITY WITH INFLAMMATION: ICD-10-CM

## 2024-10-24 DIAGNOSIS — I83.893 VARICOSE VEINS OF BILATERAL LOWER EXTREMITIES WITH OTHER COMPLICATIONS: ICD-10-CM

## 2024-10-24 PROCEDURE — 93970 EXTREMITY STUDY: CPT | Performed by: SURGERY

## 2024-10-24 PROCEDURE — 93970 EXTREMITY STUDY: CPT

## 2024-10-31 ENCOUNTER — OFFICE VISIT (OUTPATIENT)
Dept: VASCULAR SURGERY | Facility: HOSPITAL | Age: 34
End: 2024-10-31
Payer: COMMERCIAL

## 2024-10-31 VITALS
BODY MASS INDEX: 37.19 KG/M2 | SYSTOLIC BLOOD PRESSURE: 155 MMHG | WEIGHT: 315 LBS | HEART RATE: 94 BPM | DIASTOLIC BLOOD PRESSURE: 90 MMHG | HEIGHT: 77 IN | RESPIRATION RATE: 16 BRPM

## 2024-10-31 DIAGNOSIS — I83.813 PAIN DUE TO VARICOSE VEINS OF BOTH LOWER EXTREMITIES: Primary | ICD-10-CM

## 2024-10-31 DIAGNOSIS — I87.2 EDEMA OF BOTH LOWER EXTREMITIES DUE TO PERIPHERAL VENOUS INSUFFICIENCY: ICD-10-CM

## 2024-10-31 PROCEDURE — 99214 OFFICE O/P EST MOD 30 MIN: CPT | Performed by: SURGERY

## 2024-10-31 PROCEDURE — 3008F BODY MASS INDEX DOCD: CPT | Performed by: SURGERY

## 2024-10-31 PROCEDURE — 99204 OFFICE O/P NEW MOD 45 MIN: CPT | Performed by: SURGERY

## 2024-10-31 PROCEDURE — 1036F TOBACCO NON-USER: CPT | Performed by: SURGERY

## 2024-10-31 ASSESSMENT — PAIN SCALES - GENERAL: PAINLEVEL_OUTOF10: 0-NO PAIN

## 2024-10-31 ASSESSMENT — PATIENT HEALTH QUESTIONNAIRE - PHQ9
1. LITTLE INTEREST OR PLEASURE IN DOING THINGS: NOT AT ALL
SUM OF ALL RESPONSES TO PHQ9 QUESTIONS 1 AND 2: 0
2. FEELING DOWN, DEPRESSED OR HOPELESS: NOT AT ALL

## 2024-10-31 ASSESSMENT — COLUMBIA-SUICIDE SEVERITY RATING SCALE - C-SSRS
6. HAVE YOU EVER DONE ANYTHING, STARTED TO DO ANYTHING, OR PREPARED TO DO ANYTHING TO END YOUR LIFE?: NO
2. HAVE YOU ACTUALLY HAD ANY THOUGHTS OF KILLING YOURSELF?: NO
1. IN THE PAST MONTH, HAVE YOU WISHED YOU WERE DEAD OR WISHED YOU COULD GO TO SLEEP AND NOT WAKE UP?: NO

## 2024-11-22 DIAGNOSIS — F33.9 EPISODE OF RECURRENT MAJOR DEPRESSIVE DISORDER, UNSPECIFIED DEPRESSION EPISODE SEVERITY (CMS-HCC): ICD-10-CM

## 2024-11-22 DIAGNOSIS — F41.9 ANXIETY: ICD-10-CM

## 2024-11-22 DIAGNOSIS — F33.1 MODERATE EPISODE OF RECURRENT MAJOR DEPRESSIVE DISORDER: ICD-10-CM

## 2024-11-22 RX ORDER — ARIPIPRAZOLE 5 MG/1
5 TABLET ORAL DAILY
Qty: 30 TABLET | Refills: 1 | OUTPATIENT
Start: 2024-11-22

## 2024-11-22 RX ORDER — ESCITALOPRAM OXALATE 10 MG/1
10 TABLET ORAL DAILY
Qty: 30 TABLET | Refills: 2 | OUTPATIENT
Start: 2024-11-22

## 2024-11-22 RX ORDER — TRAZODONE HYDROCHLORIDE 50 MG/1
50 TABLET ORAL NIGHTLY PRN
Qty: 30 TABLET | Refills: 1 | OUTPATIENT
Start: 2024-11-22 | End: 2025-01-21

## 2024-12-04 ENCOUNTER — TELEPHONE (OUTPATIENT)
Dept: OTHER | Age: 34
End: 2024-12-04
Payer: COMMERCIAL

## 2024-12-04 NOTE — TELEPHONE ENCOUNTER
I've not seen him since 04/2024. I can't fill any of his medication until I reassess him again. He can reach out to his PCP or go to the ED.

## 2024-12-08 DIAGNOSIS — F33.9 EPISODE OF RECURRENT MAJOR DEPRESSIVE DISORDER, UNSPECIFIED DEPRESSION EPISODE SEVERITY (CMS-HCC): ICD-10-CM

## 2024-12-08 RX ORDER — TRAZODONE HYDROCHLORIDE 50 MG/1
50 TABLET ORAL NIGHTLY PRN
Qty: 30 TABLET | Refills: 1 | Status: CANCELLED | OUTPATIENT
Start: 2024-12-08 | End: 2025-02-06

## 2024-12-09 DIAGNOSIS — F33.9 EPISODE OF RECURRENT MAJOR DEPRESSIVE DISORDER, UNSPECIFIED DEPRESSION EPISODE SEVERITY (CMS-HCC): ICD-10-CM

## 2024-12-09 RX ORDER — TRAZODONE HYDROCHLORIDE 50 MG/1
50 TABLET ORAL NIGHTLY PRN
Qty: 30 TABLET | Refills: 0 | Status: SHIPPED | OUTPATIENT
Start: 2024-12-09 | End: 2025-02-07

## 2024-12-10 DIAGNOSIS — F33.9 EPISODE OF RECURRENT MAJOR DEPRESSIVE DISORDER, UNSPECIFIED DEPRESSION EPISODE SEVERITY (CMS-HCC): ICD-10-CM

## 2024-12-10 DIAGNOSIS — F33.1 MODERATE EPISODE OF RECURRENT MAJOR DEPRESSIVE DISORDER: ICD-10-CM

## 2024-12-10 DIAGNOSIS — F41.9 ANXIETY: ICD-10-CM

## 2024-12-10 RX ORDER — ARIPIPRAZOLE 5 MG/1
5 TABLET ORAL DAILY
Qty: 30 TABLET | Refills: 0 | Status: SHIPPED | OUTPATIENT
Start: 2024-12-10 | End: 2025-01-09

## 2024-12-10 RX ORDER — ESCITALOPRAM OXALATE 10 MG/1
10 TABLET ORAL DAILY
Qty: 30 TABLET | Refills: 0 | Status: SHIPPED | OUTPATIENT
Start: 2024-12-10

## 2024-12-23 ENCOUNTER — APPOINTMENT (OUTPATIENT)
Dept: BEHAVIORAL HEALTH | Facility: CLINIC | Age: 34
End: 2024-12-23
Payer: COMMERCIAL

## 2024-12-23 DIAGNOSIS — F33.2 SEVERE EPISODE OF RECURRENT MAJOR DEPRESSIVE DISORDER, WITHOUT PSYCHOTIC FEATURES (MULTI): ICD-10-CM

## 2024-12-23 DIAGNOSIS — F41.1 GENERALIZED ANXIETY DISORDER: ICD-10-CM

## 2024-12-23 PROCEDURE — 90837 PSYTX W PT 60 MINUTES: CPT

## 2024-12-23 NOTE — PSYCHOTHERAPY
Got , had to move out first week of December  Finalized middle of November, makes him mad, no fixing it, felt led on  Biggest disconnects - bad communication, finances    Moved in w/ friend, moved into a townhouse a week ago  Suicide attempt - even though , take effect mentally until day he left  It all got worse, place was empty    Getting couch, washer and dryer coming  Like a brush of fear, panic, starting  Starting over, never being able to talk to her again, got the cat  Grieving, something missing    More serious attempt, gave himself an IV, local anesthetic, causes cardiac arrest,   Didn't call anyone, told people at work, they talked him through things  Sunday evening, moved that weekend, not one positive thing  Things didn't go smoothly  Has a new kitten  Sunday - felt like he had nothing, fear and panic -   Doesn't want to keep feeling this over again    Feeling better now, talking to people helps, trying to stay positive  A lot of people at work to talk to  Co-worker in charge  of his medication, one week organizer  Medication - Trazadone, ability, lexapro, latisha Lay next Tuesday    Need more goals  [facility] IOP in the past, a possibility, not against it  Cristopher Raudel - willing to talk to    [phone]    Corrine - to meet tomorrow at 2    Family - supportive parents when he lets them be, tries not to go to them  Feels like he disappoints them or upset them  Makes assumptions   Dad in [place]    Mom upset, would be devaststed  Fear of disappointing people, living up to a standard  Not be successful - relationship issues  I failed as a     Dad texted him  Feeling up and down  Was in it for the long-run, committed, anything could be worked through    Wanted to try until day he moved out - value of commitment  Small things add up, saying positive things  Be a kind human    Forgiving himself is hard, place blame upon himself  Think of negative things he did, feels shame, disappointment  Go  over and over things  Learns by failing    Tried to control the situation, can only control his half of it  Caused agony when didn't go his way    Grieving, helpful to talk  Reach out for help    Plans for Annette - splitting a shift, going to sister's house in morning    Safety plan - to go to sister's if he feels upset

## 2024-12-23 NOTE — PROGRESS NOTES
Therapy Session  Progress Note    Session Type: Virtual    Start Time: 8:00 am  End Time: 8:55 am    Appointment: Scheduled    Reason for Visit: Depression, Anxiety    Patient Symptoms/Interval History: Feeling depressed, grieving, anxious. His divorce was finalized in November, and he moved into his own place last week. [place] committed to the marriage and kept trying to save the marriage up until he moved out. Has been grieving that loss. [place] desperate and made a suicide attempt on Sunday, 1/15 by injecting lethal medication into his vein. Said he stopped when he began to feel the effects of the medication. Didn't go to the hospital or notify anyone. Denied having any current suicidal ideation, said he has felt better since talking with his friends and family. Identified that he feels like he failed at his marriage and has negative self-talk. Open to attending an Intensive Outpatient Program if he can work it around his schedule. Peng with his feelings by working extra hours. Has a safety plan of going to his sister's house if he feels depressed or calling a friend.    Mental Status: Alert & Oriented x3    Functional Status: No impairment    Interventions Provided: CBT, Develop Coping Skills, Values Exploration    Progress Made: Minimum    Response to Interventions: The patient was receptive to the interventions provided.    Action Plan/Homework: The patient made a plan to call a friend or go to his sister's house if he feels depressed. A referral will be made to the Intensive Outpatient Program.     Treatment Plan: Use CBT to help the patient gain awareness of unhelpful thought patterns and core beliefs. Help the patient learn healthy coping skills to manage feelings of depression and anxiety.     Follow Up/Next Appointment: Follow up in 2 weeks.      LAURO Garnica, STEVENW-S    **This visit was performed using real-time telehealth tools, including an audio/video connection between the patient and this  provider. The patient provided consent for the individual telemedicine service and understands the limitations of the visit.

## 2024-12-31 ENCOUNTER — APPOINTMENT (OUTPATIENT)
Dept: BEHAVIORAL HEALTH | Facility: CLINIC | Age: 34
End: 2024-12-31
Payer: COMMERCIAL

## 2025-01-05 DIAGNOSIS — F33.1 MODERATE EPISODE OF RECURRENT MAJOR DEPRESSIVE DISORDER: ICD-10-CM

## 2025-01-05 DIAGNOSIS — F41.9 ANXIETY: ICD-10-CM

## 2025-01-05 DIAGNOSIS — F33.9 EPISODE OF RECURRENT MAJOR DEPRESSIVE DISORDER, UNSPECIFIED DEPRESSION EPISODE SEVERITY (CMS-HCC): ICD-10-CM

## 2025-01-06 RX ORDER — ESCITALOPRAM OXALATE 10 MG/1
10 TABLET ORAL DAILY
Qty: 30 TABLET | Refills: 0 | Status: SHIPPED | OUTPATIENT
Start: 2025-01-06

## 2025-01-06 RX ORDER — ARIPIPRAZOLE 5 MG/1
5 TABLET ORAL DAILY
Qty: 30 TABLET | Refills: 0 | Status: SHIPPED | OUTPATIENT
Start: 2025-01-06

## 2025-01-06 RX ORDER — TRAZODONE HYDROCHLORIDE 50 MG/1
50 TABLET ORAL NIGHTLY PRN
Qty: 30 TABLET | Refills: 0 | Status: SHIPPED | OUTPATIENT
Start: 2025-01-06 | End: 2025-03-07

## 2025-01-10 ENCOUNTER — APPOINTMENT (OUTPATIENT)
Dept: BEHAVIORAL HEALTH | Facility: CLINIC | Age: 35
End: 2025-01-10
Payer: COMMERCIAL

## 2025-01-23 ENCOUNTER — APPOINTMENT (OUTPATIENT)
Dept: BEHAVIORAL HEALTH | Facility: CLINIC | Age: 35
End: 2025-01-23
Payer: COMMERCIAL

## 2025-01-23 DIAGNOSIS — F41.9 ANXIETY: ICD-10-CM

## 2025-01-23 DIAGNOSIS — F33.9 EPISODE OF RECURRENT MAJOR DEPRESSIVE DISORDER, UNSPECIFIED DEPRESSION EPISODE SEVERITY (CMS-HCC): ICD-10-CM

## 2025-01-23 DIAGNOSIS — F31.81 BIPOLAR 2 DISORDER (MULTI): ICD-10-CM

## 2025-01-23 DIAGNOSIS — F33.1 MODERATE EPISODE OF RECURRENT MAJOR DEPRESSIVE DISORDER: ICD-10-CM

## 2025-01-23 PROCEDURE — 99215 OFFICE O/P EST HI 40 MIN: CPT

## 2025-01-23 RX ORDER — CYCLOBENZAPRINE HCL 5 MG
1 TABLET ORAL
COMMUNITY
Start: 2024-12-18

## 2025-01-23 RX ORDER — ESCITALOPRAM OXALATE 10 MG/1
10 TABLET ORAL DAILY
Qty: 90 TABLET | Refills: 1 | Status: SHIPPED | OUTPATIENT
Start: 2025-01-23 | End: 2025-07-22

## 2025-01-23 RX ORDER — ARIPIPRAZOLE 5 MG/1
5 TABLET ORAL DAILY
Qty: 90 TABLET | Refills: 1 | Status: SHIPPED | OUTPATIENT
Start: 2025-01-23 | End: 2025-07-22

## 2025-01-23 RX ORDER — TRAZODONE HYDROCHLORIDE 50 MG/1
50-100 TABLET ORAL NIGHTLY PRN
Qty: 180 TABLET | Refills: 1 | Status: SHIPPED | OUTPATIENT
Start: 2025-01-23 | End: 2026-01-23

## 2025-01-23 RX ORDER — BUSPIRONE HYDROCHLORIDE 5 MG/1
5 TABLET ORAL 2 TIMES DAILY
Qty: 180 TABLET | Refills: 1 | Status: SHIPPED | OUTPATIENT
Start: 2025-01-23 | End: 2025-07-22

## 2025-01-23 RX ORDER — LIDOCAINE 50 MG/G
PATCH TOPICAL
COMMUNITY
Start: 2024-12-18

## 2025-01-23 ASSESSMENT — COLUMBIA-SUICIDE SEVERITY RATING SCALE - C-SSRS
1. IN THE PAST MONTH, HAVE YOU WISHED YOU WERE DEAD OR WISHED YOU COULD GO TO SLEEP AND NOT WAKE UP?: NO
5. HAVE YOU STARTED TO WORK OUT OR WORKED OUT THE DETAILS OF HOW TO KILL YOURSELF? DO YOU INTEND TO CARRY OUT THIS PLAN?: YES
ATTEMPT PAST THREE MONTHS: NO
5. HAVE YOU STARTED TO WORK OUT OR WORKED OUT THE DETAILS OF HOW TO KILL YOURSELF? DO YOU INTEND TO CARRY OUT THIS PLAN?: NO
2. HAVE YOU ACTUALLY HAD ANY THOUGHTS OF KILLING YOURSELF?: NO
TOTAL  NUMBER OF ABORTED OR SELF INTERRUPTED ATTEMPTS LIFETIME: 1
4. HAVE YOU HAD THESE THOUGHTS AND HAD SOME INTENTION OF ACTING ON THEM?: NO
MOST RECENT DATE: 66914
TOTAL  NUMBER OF ACTUAL ATTEMPTS LIFETIME: 1
TOTAL  NUMBER OF ABORTED OR SELF INTERRUPTED ATTEMPTS LIFETIME: YES
2. HAVE YOU ACTUALLY HAD ANY THOUGHTS OF KILLING YOURSELF?: YES
1. HAVE YOU WISHED YOU WERE DEAD OR WISHED YOU COULD GO TO SLEEP AND NOT WAKE UP?: YES
TOTAL  NUMBER OF ABORTED OR SELF INTERRUPTED ATTEMPTS PAST 3 MONTHS: NO
TOTAL  NUMBER OF INTERRUPTED ATTEMPTS LIFETIME: NO
6. HAVE YOU EVER DONE ANYTHING, STARTED TO DO ANYTHING, OR PREPARED TO DO ANYTHING TO END YOUR LIFE?: NO
3. HAVE YOU BEEN THINKING ABOUT HOW YOU MIGHT KILL YOURSELF?: YES
ATTEMPT LIFETIME: YES

## 2025-01-23 ASSESSMENT — ANXIETY QUESTIONNAIRES
2. NOT BEING ABLE TO STOP OR CONTROL WORRYING: NOT AT ALL
7. FEELING AFRAID AS IF SOMETHING AWFUL MIGHT HAPPEN: NOT AT ALL
6. BECOMING EASILY ANNOYED OR IRRITABLE: NOT AT ALL
3. WORRYING TOO MUCH ABOUT DIFFERENT THINGS: NOT AT ALL
GAD7 TOTAL SCORE: 0
1. FEELING NERVOUS, ANXIOUS, OR ON EDGE: NOT AT ALL
4. TROUBLE RELAXING: NOT AT ALL
IF YOU CHECKED OFF ANY PROBLEMS ON THIS QUESTIONNAIRE, HOW DIFFICULT HAVE THESE PROBLEMS MADE IT FOR YOU TO DO YOUR WORK, TAKE CARE OF THINGS AT HOME, OR GET ALONG WITH OTHER PEOPLE: NOT DIFFICULT AT ALL
5. BEING SO RESTLESS THAT IT IS HARD TO SIT STILL: NOT AT ALL

## 2025-01-23 ASSESSMENT — PATIENT HEALTH QUESTIONNAIRE - PHQ9
SUM OF ALL RESPONSES TO PHQ9 QUESTIONS 1 & 2: 2
1. LITTLE INTEREST OR PLEASURE IN DOING THINGS: SEVERAL DAYS
2. FEELING DOWN, DEPRESSED OR HOPELESS: SEVERAL DAYS
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
## 2025-01-27 ENCOUNTER — APPOINTMENT (OUTPATIENT)
Dept: BEHAVIORAL HEALTH | Facility: CLINIC | Age: 35
End: 2025-01-27
Payer: COMMERCIAL

## 2025-01-27 DIAGNOSIS — F33.1 MODERATE EPISODE OF RECURRENT MAJOR DEPRESSIVE DISORDER: ICD-10-CM

## 2025-01-27 DIAGNOSIS — F41.9 ANXIETY: ICD-10-CM

## 2025-01-27 DIAGNOSIS — F31.81 BIPOLAR 2 DISORDER (MULTI): ICD-10-CM

## 2025-01-27 PROCEDURE — 90837 PSYTX W PT 60 MINUTES: CPT

## 2025-01-27 NOTE — PROGRESS NOTES
Therapy Session  Progress Note    Session Type: Virtual    Start Time: 8:00 am  End Time: 8:55 am    Appointment: Scheduled    Reason for Visit: Depression, Anxiety, Bipolar Disorder    Patient Symptoms/Interval History: Feeling lonely, disconnected, has a lack of motivation to spend time with friends and family. Feels like it would require too much effort. Overall, he feels like his mood has improved over the last few weeks. Has been reading a book about someone with bipolar disorder and feels like he can relate to what is shared, particularly about their experiences with having manic episodes. Feels like his medications are working well. Trying to adjust to living alone, recently got a new kitten which he enjoys.    Mental Status: Alert & Oriented x3    Functional Status: No impairment    Interventions Provided: CBT, Behavioral Activation    Progress Made: Moderate    Response to Interventions: The patient was receptive to the interventions provided.    Action Plan/Homework: The patient will work on reaching out to his friends and family for support. He will tell them ahead of time that he will only spend a small amount of time with them (since long visits feel like too much effort).     Treatment Plan: Use CBT to help the patient gain awareness of unhelpful thought patterns and core beliefs. Help the patient learn healthy coping skills to manage feelings of depression.     Follow Up/Next Appointment: Follow up in 2 weeks.      LAURO Garnica, AKINS    **This visit was performed using real-time telehealth tools, including an audio/video connection between the patient and this provider. The patient provided consent for the individual telemedicine service and understands the limitations of the visit.

## 2025-02-13 ENCOUNTER — APPOINTMENT (OUTPATIENT)
Dept: BEHAVIORAL HEALTH | Facility: CLINIC | Age: 35
End: 2025-02-13
Payer: COMMERCIAL

## 2025-03-14 DIAGNOSIS — I83.813 PAIN DUE TO VARICOSE VEINS OF BOTH LOWER EXTREMITIES: ICD-10-CM

## 2025-04-17 ENCOUNTER — APPOINTMENT (OUTPATIENT)
Dept: BEHAVIORAL HEALTH | Facility: CLINIC | Age: 35
End: 2025-04-17
Payer: COMMERCIAL

## 2025-04-17 DIAGNOSIS — F63.2 KLEPTOMANIA IN ADULT: ICD-10-CM

## 2025-04-17 DIAGNOSIS — F33.1 MODERATE EPISODE OF RECURRENT MAJOR DEPRESSIVE DISORDER: ICD-10-CM

## 2025-04-17 DIAGNOSIS — F41.9 ANXIETY: ICD-10-CM

## 2025-04-17 DIAGNOSIS — F31.81 BIPOLAR 2 DISORDER (MULTI): ICD-10-CM

## 2025-04-17 DIAGNOSIS — F33.9 EPISODE OF RECURRENT MAJOR DEPRESSIVE DISORDER, UNSPECIFIED DEPRESSION EPISODE SEVERITY: ICD-10-CM

## 2025-04-17 PROCEDURE — 99214 OFFICE O/P EST MOD 30 MIN: CPT

## 2025-04-17 RX ORDER — BUSPIRONE HYDROCHLORIDE 5 MG/1
5 TABLET ORAL 2 TIMES DAILY
Qty: 180 TABLET | Refills: 1 | Status: SHIPPED | OUTPATIENT
Start: 2025-04-17 | End: 2025-10-14

## 2025-04-17 RX ORDER — ARIPIPRAZOLE 5 MG/1
5 TABLET ORAL DAILY
Qty: 90 TABLET | Refills: 1 | Status: SHIPPED | OUTPATIENT
Start: 2025-04-17 | End: 2025-10-14

## 2025-04-17 RX ORDER — ESCITALOPRAM OXALATE 10 MG/1
10 TABLET ORAL DAILY
Qty: 90 TABLET | Refills: 1 | Status: SHIPPED | OUTPATIENT
Start: 2025-04-17 | End: 2025-10-14

## 2025-04-17 RX ORDER — TRAZODONE HYDROCHLORIDE 50 MG/1
50-100 TABLET ORAL NIGHTLY PRN
Qty: 180 TABLET | Refills: 1 | Status: SHIPPED | OUTPATIENT
Start: 2025-04-17 | End: 2026-04-17
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
## 2025-05-21 ENCOUNTER — HOSPITAL ENCOUNTER (OUTPATIENT)
Dept: RADIOLOGY | Facility: HOSPITAL | Age: 35
Discharge: HOME | End: 2025-05-21
Payer: COMMERCIAL

## 2025-05-21 ENCOUNTER — OFFICE VISIT (OUTPATIENT)
Dept: ORTHOPEDIC SURGERY | Facility: HOSPITAL | Age: 35
End: 2025-05-21
Payer: COMMERCIAL

## 2025-05-21 VITALS — BODY MASS INDEX: 37.19 KG/M2 | WEIGHT: 315 LBS | HEIGHT: 77 IN

## 2025-05-21 DIAGNOSIS — M25.532 WRIST PAIN, ACUTE, LEFT: ICD-10-CM

## 2025-05-21 PROCEDURE — 73110 X-RAY EXAM OF WRIST: CPT | Mod: LT

## 2025-05-21 PROCEDURE — 20550 NJX 1 TENDON SHEATH/LIGAMENT: CPT | Performed by: STUDENT IN AN ORGANIZED HEALTH CARE EDUCATION/TRAINING PROGRAM

## 2025-05-21 PROCEDURE — 73110 X-RAY EXAM OF WRIST: CPT | Mod: LEFT SIDE | Performed by: RADIOLOGY

## 2025-05-21 PROCEDURE — 99202 OFFICE O/P NEW SF 15 MIN: CPT | Mod: 25 | Performed by: STUDENT IN AN ORGANIZED HEALTH CARE EDUCATION/TRAINING PROGRAM

## 2025-05-21 PROCEDURE — 3008F BODY MASS INDEX DOCD: CPT | Performed by: STUDENT IN AN ORGANIZED HEALTH CARE EDUCATION/TRAINING PROGRAM

## 2025-05-21 PROCEDURE — 99204 OFFICE O/P NEW MOD 45 MIN: CPT | Performed by: STUDENT IN AN ORGANIZED HEALTH CARE EDUCATION/TRAINING PROGRAM

## 2025-05-21 ASSESSMENT — PAIN DESCRIPTION - DESCRIPTORS: DESCRIPTORS: ACHING;SORE

## 2025-05-21 ASSESSMENT — PAIN - FUNCTIONAL ASSESSMENT: PAIN_FUNCTIONAL_ASSESSMENT: 0-10

## 2025-05-27 NOTE — PROGRESS NOTES
"CHIEF COMPLAINT: Left radial sided wrist pain  DOI: None  DOS: None      HISTORY OF PRESENT ILLNESS    This is a very pleasant 35-year-old male, right-hand-dominant, works in [employer] [place], denies active tobacco use denies diabetes denies anticoagulation use denies prior surgeries to his upper extremities denies diagnosis of rheumatoid arthritis or gout.  He is presenting as a new patient evaluation for recurrent sharp radial sided left wrist pain.  He believes that he has a diagnosis of de Quervain's tenosynovitis which was treated with 2 prior corticosteroid injections over the past few years she responded very well to.  This pain has since recurred and sharp focal worse with any activities using his thumb.  Denies any numbness tingling paresthesias.  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      PHYSICAL EXAM    Extremities / Musculoskeletal:                  Left wrist:  No active skin lesions open wounds no erythema edema or ecchymoses.  Focally tender to palpation about the first dorsal extensor compartment at the level of the radial styloid.  Positive Eickhoff maneuver positive Finkelstein's maneuver.  Nontender palpation about the anatomic snuffbox and thumb CMC joint.  Motor intact to radian/PIN/median/AIN/Ulnar nerve distributions. Sensation intact to light touch in the median/ulnar/radial nerve distributions. 2+ radial pulse at the wrist, hand and all digits are warm and well-perfused with a brisk capillary refill of <2s.       HgA1c:   No results found for: \"HGBA1C\", \"ABKCFXIK6D\"    IMAGING / LABS / EMGs:    Left wrist x-ray series performed on 5/21/2025 independent review demonstrating stable global alignment no signs of acute fracture or dislocation.  Well-maintained joint spaces.    Medical History[1]    Medication Documentation Review Audit       Reviewed by Carleen Enrique LPN (Licensed Nurse) on 05/21/25 at 1521      Medication Order Taking? Sig " Documenting Provider Last Dose Status   ARIPiprazole (Abilify) 5 mg tablet [id number]  Take 1 tablet (5 mg) by mouth once daily. RUCHI Renteria  Active   busPIRone (Buspar) 5 mg tablet [id number]  Take 1 tablet (5 mg) by mouth 2 times a day. RUCHI Renteria  Active   cholecalciferol (Vitamin D-3) 50 MCG (2000 UT) tablet [id number]  Take 1 tablet (2,000 Units) by mouth once daily.   Patient not taking: Reported on 10/31/2024    Historical Provider, MD  Active   cyclobenzaprine (Flexeril) 5 mg tablet [id number]  Take 1 tablet (5 mg) by mouth every 12 hours. Historical Provider, MD  Active   escitalopram (Lexapro) 10 mg tablet [id number]  Take 1 tablet (10 mg) by mouth once daily. RUCHI Renteria  Active   lidocaine (Lidoderm) 5 % patch [id number]  PLEASE SEE ATTACHED FOR DETAILED DIRECTIONS Historical Provider, MD  Active   methocarbamol (Robaxin) 750 mg tablet [id number]  Take by mouth every 6 hours.   Patient not taking: Reported on 10/31/2024    Historical Provider, MD  Active   methylPREDNISolone (Medrol Dospak) 4 mg tablets [id number]  TAKE 6 TABLETS ON DAY 1 AS DIRECTED ON PACKAGE AND DECREASE BY 1 TAB EACH DAY FOR A TOTAL OF 6 DAYS   Patient not taking: Reported on 10/31/2024    Historical Provider, MD  Active   methylPREDNISolone (Medrol) 4 mg tablet [id number]  Take by mouth.   Patient not taking: Reported on 10/31/2024    Historical Provider, MD  Active   multivit-min/ferrous fumarate (MULTI VITAMIN ORAL) [id number]  Take by mouth.   Patient not taking: Reported on 10/31/2024    Historical Provider, MD  Active   ondansetron ODT (Zofran-ODT) 4 mg disintegrating tablet [id number]  Take 1 tablet (4 mg) by mouth every 8 hours if needed. Historical Provider, MD  Active   traZODone (Desyrel) 50 mg tablet [id number]  Take 1-2 tablets ( mg) by mouth as needed at bedtime for sleep. RUCHI Renteria  Active                    RX Allergies[2]    Surgical  History[3]      ASSESSMENT:   Left de Quervain's tenosynovitis    We have a long discussion regarding my assessment as well as available treatment options.  He has responded very well to injections in the past.  I think it be reasonable to repeat a corticosteroid injection.  We did discuss that the likelihood for lifetime allowance of corticosteroid injections at 1 particular site to approximately 3-4 and we discussed that we may want to consider surgery after this injection if symptoms recur.    We discussed the risk of injection which include infection and skin hypopigmentation.    Patient clearly understands the clinical scenario.  All questions were answered.  There are no barriers to understanding.    Procedure:  1cc consisting of equal parts 1% lidocaine without epinephrine and 40mg/mL Kenalog solutions. This was injected into the first dorsal extensor compartment on the left side. This was done in the usual sterile fashion, the patient tolerated the procedure well.       PLAN:   L Gely injection today     Follow-up in: Although we do not need a scheduled follow-up visit at this time, I encouraged the patient to return to clinic if they have any concerns or issues whatsoever. The patient was provided with my office's contact information.     XR at next visit: none    The diagnosis and treatment plan were reviewed with the patient. All questions were answered. The patient verbalized understanding of the treatment plan. There were no barriers to understanding identified.     Note dictated with [employer] software.  Completed without full type editing and sent to avoid delay.    David Willard M.D.    Department of Orthopaedics  Hand/Upper Extremity  Phone: [phone]  Appt. Phone: [phone]\         [1]   Past Medical History:  Diagnosis Date    Other specified health status     No known health problems   [2]   Allergies  Allergen Reactions    Animal Dander  Unknown    Other Other   [3]   Past Surgical History:  Procedure Laterality Date    FOOT SURGERY  09/19/2017    Foot Repair    KNEE SURGERY  09/19/2017    Knee Surgery    TONSILLECTOMY  09/19/2017    Tonsillectomy

## 2025-06-24 ENCOUNTER — PROCEDURE VISIT (OUTPATIENT)
Dept: VASCULAR SURGERY | Facility: CLINIC | Age: 35
End: 2025-06-24
Payer: COMMERCIAL

## 2025-06-24 DIAGNOSIS — I83.812 VARICOSE VEINS OF LEFT LOWER EXTREMITY WITH PAIN: Primary | ICD-10-CM

## 2025-06-24 DIAGNOSIS — I83.892 VARICOSE VEINS OF LEFT LEG WITH EDEMA: ICD-10-CM

## 2025-06-24 PROCEDURE — 36475 ENDOVENOUS RF 1ST VEIN: CPT | Performed by: SURGERY

## 2025-06-24 ASSESSMENT — COLUMBIA-SUICIDE SEVERITY RATING SCALE - C-SSRS
1. IN THE PAST MONTH, HAVE YOU WISHED YOU WERE DEAD OR WISHED YOU COULD GO TO SLEEP AND NOT WAKE UP?: NO
2. HAVE YOU ACTUALLY HAD ANY THOUGHTS OF KILLING YOURSELF?: NO
6. HAVE YOU EVER DONE ANYTHING, STARTED TO DO ANYTHING, OR PREPARED TO DO ANYTHING TO END YOUR LIFE?: NO

## 2025-06-24 NOTE — PROGRESS NOTES
Jesus Castañeda was brought into the PROCEDURAL ROOM room.   After timeout and verification of name, , mrn and allergies, the procedure was initiated.   Verification was additionally obtained by reviewing pre-procedural venous insufficiency studies.     The left leg was prepped and draped in standard fashion with chlorhexadine.   On table Duplex ultrasound was used to map out the insufficient saphenous vein, rule out any new changes at the groin level and access was determined. The GSV appeared to join the deep system in the SFJ.       Ultrasound guidance was again used to localize the access site.   This was at the mid calf level. TUMESCENT ANESTHESIA was injected as a local anesthetic in the subcutaneous tissues at the target location. Using ultrasound guidance, access was gained at with the 19 gauge thin walled access needle and followed by introduction of a short guidewire, location confirmed with ultrasound. a micropuncture was used first to facilitate access. A small, 2 mm incision was made at the access site to allow for introduction and placement of the 6 Fr x7cm introducer/dilator. The dilator and guidewire were removed. The 8/100 cm RFA probe device was then inserted up along the ____GSV to the SFJ level under US. . CATHETER WAS PLACED ~2.5CM FROM THE SFJ.  We then proceeded with tumescent anesthesia for a total of 500 cc of volume under US guidance. TIP OF CATHETER WAS RECHECKED AND CONFIRMED TO NOT HAVE MOVED.  RFA was performed twice cephalad and 9 cycles were performed for a total of 3:00 MINUTES.      TREATMENT LENGTH: 62.5cm    Physical Exam  Musculoskeletal:        Legs:      Following this, the sheath and catheter was removed and hemostasis was achieved with manual compression. steri strips were applied over the wound.  gauze and tegaderm were applied over this. Ultrasound confirmed complete coaptation and closure of the treated segments of the GSV, and the absence of any DVT as well.    The  drapes were removed and the patient cleaned and prepared for discharge.   Post op ultrasound check is scheduled for 48-72 hours and the patient was given written post-op instructions.    STAFF: TINO PLASCENCIA PO, BOYD  START: 1439  END:  1513    NOTABLE FINDINGS: GOOD coverage of gsv leading to sx tributaries and with reflux. Will proceed with right side.       Milo Plascencia MD, MHS, RPVI  , [employer] [facility] [employer]  Director, Center for Comprehensive Venous Care, [facility] [place]  Co-Director, Vascular Laboratories, [facility] [place]  Division of Vascular Surgery and Endovascular Therapy  [facility]

## 2025-07-01 ENCOUNTER — HOSPITAL ENCOUNTER (OUTPATIENT)
Dept: VASCULAR MEDICINE | Facility: HOSPITAL | Age: 35
Discharge: HOME | End: 2025-07-01
Payer: COMMERCIAL

## 2025-07-01 DIAGNOSIS — I83.813 PAIN DUE TO VARICOSE VEINS OF BOTH LOWER EXTREMITIES: ICD-10-CM

## 2025-07-01 PROCEDURE — 93971 EXTREMITY STUDY: CPT

## 2025-07-01 PROCEDURE — 93971 EXTREMITY STUDY: CPT | Performed by: SURGERY

## 2025-07-08 ENCOUNTER — PROCEDURE VISIT (OUTPATIENT)
Dept: VASCULAR SURGERY | Facility: CLINIC | Age: 35
End: 2025-07-08
Payer: COMMERCIAL

## 2025-07-08 VITALS
DIASTOLIC BLOOD PRESSURE: 87 MMHG | WEIGHT: 315 LBS | HEIGHT: 77 IN | BODY MASS INDEX: 37.19 KG/M2 | SYSTOLIC BLOOD PRESSURE: 130 MMHG

## 2025-07-08 DIAGNOSIS — I83.813 PAIN DUE TO VARICOSE VEINS OF BOTH LOWER EXTREMITIES: ICD-10-CM

## 2025-07-08 DIAGNOSIS — I87.2 EDEMA OF BOTH LOWER EXTREMITIES DUE TO PERIPHERAL VENOUS INSUFFICIENCY: ICD-10-CM

## 2025-07-08 DIAGNOSIS — I83.812 VARICOSE VEINS OF LEFT LOWER EXTREMITY WITH PAIN: Primary | ICD-10-CM

## 2025-07-08 PROCEDURE — 36475 ENDOVENOUS RF 1ST VEIN: CPT | Performed by: SURGERY

## 2025-07-08 RX ORDER — ESCITALOPRAM OXALATE 10 MG/1
10 TABLET ORAL DAILY
COMMUNITY

## 2025-07-08 ASSESSMENT — ENCOUNTER SYMPTOMS
LOSS OF SENSATION IN FEET: 0
OCCASIONAL FEELINGS OF UNSTEADINESS: 0
DEPRESSION: 0

## 2025-07-08 NOTE — PROGRESS NOTES
Jesus Castañeda was brought into the PROCEDURAL ROOM room.   After timeout and verification of name, , mrn and allergies, the procedure was initiated.   Verification was additionally obtained by reviewing pre-procedural venous insufficiency studies.     The right leg was prepped and draped in standard fashion with chlorhexadine.   On table Duplex ultrasound was used to map out the insufficient saphenous vein, rule out any new changes at the groin level and access was determined. The GSV appeared to join the deep system in the SFJ.       Ultrasound guidance was again used to localize the access site.   This was at the mid calf level. TUMESCENT ANESTHESIA was injected as a local anesthetic in the subcutaneous tissues at the target location. Using ultrasound guidance, access was gained at with the 19 gauge thin walled access needle and followed by introduction of a short guidewire, location confirmed with ultrasound. a micropuncture was used first to facilitate access. A small, 2 mm incision was made at the access site to allow for introduction and placement of the 7 Fr x7cm introducer/dilator. The dilator and guidewire were removed. The 8/100 cm RFA probe device was then inserted up along the ____GSV to the SFJ level under US. . CATHETER WAS PLACED ~2.5CM FROM THE SFJ.  We then proceeded with tumescent anesthesia for a total of 550 cc of volume under US guidance. TIP OF CATHETER WAS RECHECKED AND CONFIRMED TO NOT HAVE MOVED.  RFA was performed twice cephalad and 9 cycles were performed for a total of 3 MINUTES.      TREATMENT LENGTH: 68CM    Physical Exam  Musculoskeletal:        Legs:          Following this, the sheath and catheter was removed and hemostasis was achieved with manual compression. steri strips were applied over the wound.  gauze and tegaderm were applied over this. Ultrasound confirmed complete coaptation and closure of the treated segments of the GSV, and the absence of any DVT as well.    The  drapes were removed and the patient cleaned and prepared for discharge.   Post op ultrasound check is scheduled for 48-72 hours and the patient was given written post-op instructions.    STAFF: TINO PLASCENCIA KNAPP, GORCZYNSKI  START: 1421  END:  8496    NOTABLE FINDINGS: HAS HAD VERY NICE RESULT ON THE LEFT WITH NOW VERY DECOMPRESSED VARICES S/P RFA. WILL HAVE FOLLOW UP VISIT AFTER TODAY          Milo Plascencia MD, MHS, RPVI  , [facility] [employer]  Director, Center for Comprehensive Venous Care, [facility] [place]  Co-Director, Vascular Laboratories, [facility] [place]  Division of Vascular Surgery and Endovascular Therapy  [facility]

## 2025-07-15 ENCOUNTER — HOSPITAL ENCOUNTER (OUTPATIENT)
Dept: VASCULAR MEDICINE | Facility: HOSPITAL | Age: 35
Discharge: HOME | End: 2025-07-15
Payer: COMMERCIAL

## 2025-07-15 DIAGNOSIS — I83.813 PAIN DUE TO VARICOSE VEINS OF BOTH LOWER EXTREMITIES: ICD-10-CM

## 2025-07-15 DIAGNOSIS — I83.891 VARICOSE VEINS OF RIGHT LOWER EXTREMITY WITH OTHER COMPLICATIONS: ICD-10-CM

## 2025-07-15 PROCEDURE — 93971 EXTREMITY STUDY: CPT

## 2025-07-15 PROCEDURE — 93971 EXTREMITY STUDY: CPT | Performed by: INTERNAL MEDICINE

## 2025-08-07 ENCOUNTER — APPOINTMENT (OUTPATIENT)
Dept: BEHAVIORAL HEALTH | Facility: CLINIC | Age: 35
End: 2025-08-07
Payer: COMMERCIAL

## 2025-08-07 DIAGNOSIS — F33.1 MODERATE EPISODE OF RECURRENT MAJOR DEPRESSIVE DISORDER: ICD-10-CM

## 2025-08-07 DIAGNOSIS — F31.81 BIPOLAR 2 DISORDER (MULTI): ICD-10-CM

## 2025-08-07 DIAGNOSIS — F41.9 ANXIETY: ICD-10-CM

## 2025-08-07 PROCEDURE — 99214 OFFICE O/P EST MOD 30 MIN: CPT

## 2025-08-07 PROCEDURE — 1036F TOBACCO NON-USER: CPT

## 2025-08-07 RX ORDER — ARIPIPRAZOLE 5 MG/1
5 TABLET ORAL DAILY
Qty: 90 TABLET | Refills: 1 | Status: SHIPPED | OUTPATIENT
Start: 2025-08-07 | End: 2026-02-03

## 2025-08-07 RX ORDER — ESCITALOPRAM OXALATE 10 MG/1
10 TABLET ORAL DAILY
Qty: 90 TABLET | Refills: 1 | Status: SHIPPED | OUTPATIENT
Start: 2025-08-07 | End: 2026-02-03

## 2025-08-07 RX ORDER — BUSPIRONE HYDROCHLORIDE 5 MG/1
5 TABLET ORAL 2 TIMES DAILY
Qty: 180 TABLET | Refills: 1 | Status: SHIPPED | OUTPATIENT
Start: 2025-08-07 | End: 2026-02-03
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
## 2025-08-13 ENCOUNTER — APPOINTMENT (OUTPATIENT)
Dept: VASCULAR SURGERY | Facility: CLINIC | Age: 35
End: 2025-08-13
Payer: COMMERCIAL

## 2025-08-13 VITALS
HEART RATE: 98 BPM | WEIGHT: 315 LBS | BODY MASS INDEX: 37.19 KG/M2 | DIASTOLIC BLOOD PRESSURE: 89 MMHG | SYSTOLIC BLOOD PRESSURE: 137 MMHG | HEIGHT: 77 IN

## 2025-08-13 DIAGNOSIS — I83.893 VARICOSE VEINS OF LEG WITH EDEMA, BILATERAL: Primary | ICD-10-CM

## 2025-08-13 PROCEDURE — 99212 OFFICE O/P EST SF 10 MIN: CPT | Performed by: SURGERY

## 2025-08-13 PROCEDURE — 1036F TOBACCO NON-USER: CPT | Performed by: SURGERY

## 2025-08-13 PROCEDURE — 3008F BODY MASS INDEX DOCD: CPT | Performed by: SURGERY

## 2025-08-13 ASSESSMENT — COLUMBIA-SUICIDE SEVERITY RATING SCALE - C-SSRS
1. IN THE PAST MONTH, HAVE YOU WISHED YOU WERE DEAD OR WISHED YOU COULD GO TO SLEEP AND NOT WAKE UP?: NO
6. HAVE YOU EVER DONE ANYTHING, STARTED TO DO ANYTHING, OR PREPARED TO DO ANYTHING TO END YOUR LIFE?: NO
2. HAVE YOU ACTUALLY HAD ANY THOUGHTS OF KILLING YOURSELF?: NO

## 2025-08-13 ASSESSMENT — ENCOUNTER SYMPTOMS
OCCASIONAL FEELINGS OF UNSTEADINESS: 0
LOSS OF SENSATION IN FEET: 0
DEPRESSION: 0

## 2025-08-13 ASSESSMENT — PATIENT HEALTH QUESTIONNAIRE - PHQ9
2. FEELING DOWN, DEPRESSED OR HOPELESS: NOT AT ALL
SUM OF ALL RESPONSES TO PHQ9 QUESTIONS 1 AND 2: 0
1. LITTLE INTEREST OR PLEASURE IN DOING THINGS: NOT AT ALL

## 2025-10-30 ENCOUNTER — APPOINTMENT (OUTPATIENT)
Dept: BEHAVIORAL HEALTH | Facility: CLINIC | Age: 35
End: 2025-10-30
Payer: COMMERCIAL